# Patient Record
Sex: FEMALE | Race: WHITE | NOT HISPANIC OR LATINO | URBAN - METROPOLITAN AREA
[De-identification: names, ages, dates, MRNs, and addresses within clinical notes are randomized per-mention and may not be internally consistent; named-entity substitution may affect disease eponyms.]

---

## 2023-12-27 DIAGNOSIS — E78.5 HYPERLIPIDEMIA, UNSPECIFIED HYPERLIPIDEMIA TYPE: Primary | ICD-10-CM

## 2023-12-28 RX ORDER — ROSUVASTATIN CALCIUM 10 MG/1
10 TABLET, COATED ORAL DAILY
Qty: 90 TABLET | Refills: 3 | Status: SHIPPED | OUTPATIENT
Start: 2023-12-28

## 2024-01-30 DIAGNOSIS — I10 BENIGN ESSENTIAL HYPERTENSION: Primary | ICD-10-CM

## 2024-01-30 RX ORDER — LISINOPRIL AND HYDROCHLOROTHIAZIDE 10; 12.5 MG/1; MG/1
1 TABLET ORAL DAILY
Qty: 90 TABLET | Refills: 1 | Status: SHIPPED | OUTPATIENT
Start: 2024-01-30

## 2024-05-03 ENCOUNTER — APPOINTMENT (OUTPATIENT)
Dept: PRIMARY CARE | Facility: CLINIC | Age: 64
End: 2024-05-03
Payer: COMMERCIAL

## 2024-06-14 ENCOUNTER — OFFICE VISIT (OUTPATIENT)
Dept: PRIMARY CARE | Facility: CLINIC | Age: 64
End: 2024-06-14
Payer: COMMERCIAL

## 2024-06-14 VITALS
SYSTOLIC BLOOD PRESSURE: 138 MMHG | BODY MASS INDEX: 30.7 KG/M2 | HEIGHT: 66 IN | OXYGEN SATURATION: 99 % | HEART RATE: 79 BPM | WEIGHT: 191 LBS | DIASTOLIC BLOOD PRESSURE: 84 MMHG

## 2024-06-14 DIAGNOSIS — R73.01 IFG (IMPAIRED FASTING GLUCOSE): ICD-10-CM

## 2024-06-14 DIAGNOSIS — Z00.00 ROUTINE PHYSICAL EXAMINATION: Primary | ICD-10-CM

## 2024-06-14 DIAGNOSIS — E78.5 HYPERLIPIDEMIA, UNSPECIFIED HYPERLIPIDEMIA TYPE: ICD-10-CM

## 2024-06-14 DIAGNOSIS — I10 ESSENTIAL HYPERTENSION, BENIGN: ICD-10-CM

## 2024-06-14 DIAGNOSIS — R73.03 PRE-DIABETES: ICD-10-CM

## 2024-06-14 DIAGNOSIS — E78.2 MIXED HYPERLIPIDEMIA: ICD-10-CM

## 2024-06-14 PROCEDURE — 3079F DIAST BP 80-89 MM HG: CPT | Performed by: PHYSICIAN ASSISTANT

## 2024-06-14 PROCEDURE — 99396 PREV VISIT EST AGE 40-64: CPT | Performed by: PHYSICIAN ASSISTANT

## 2024-06-14 PROCEDURE — 3075F SYST BP GE 130 - 139MM HG: CPT | Performed by: PHYSICIAN ASSISTANT

## 2024-06-14 PROCEDURE — 1036F TOBACCO NON-USER: CPT | Performed by: PHYSICIAN ASSISTANT

## 2024-06-14 RX ORDER — LISINOPRIL AND HYDROCHLOROTHIAZIDE 12.5; 2 MG/1; MG/1
1 TABLET ORAL DAILY
Qty: 90 TABLET | Refills: 3 | Status: SHIPPED | OUTPATIENT
Start: 2024-06-14 | End: 2025-07-19

## 2024-06-14 RX ORDER — ROSUVASTATIN CALCIUM 10 MG/1
10 TABLET, COATED ORAL DAILY
Qty: 90 TABLET | Refills: 3 | Status: SHIPPED | OUTPATIENT
Start: 2024-06-14

## 2024-06-14 ASSESSMENT — PAIN SCALES - GENERAL: PAINLEVEL: 0-NO PAIN

## 2024-06-14 ASSESSMENT — PATIENT HEALTH QUESTIONNAIRE - PHQ9
2. FEELING DOWN, DEPRESSED OR HOPELESS: NOT AT ALL
1. LITTLE INTEREST OR PLEASURE IN DOING THINGS: NOT AT ALL
SUM OF ALL RESPONSES TO PHQ9 QUESTIONS 1 AND 2: 0

## 2024-06-14 NOTE — PROGRESS NOTES
"Subjective   Patient ID: Magda Jaimes is a 63 y.o. female who presents for Annual Exam.    Presents for RPE.   Doing well overall. Prior  labs show IFG and IR pattern.   She denies PC, SON, any other concerns.   Colon CA screening UTD.   Mammogram 1/24         Review of Systems   All other systems reviewed and are negative.      Objective   /84   Pulse 79   Ht 1.676 m (5' 6\")   Wt 86.6 kg (191 lb)   SpO2 99%   BMI 30.83 kg/m²     Physical Exam  Constitutional:       Appearance: Normal appearance.   HENT:      Right Ear: Tympanic membrane and ear canal normal.      Left Ear: Ear canal normal.      Nose: Nose normal.      Mouth/Throat:      Pharynx: Oropharynx is clear.   Eyes:      Pupils: Pupils are equal, round, and reactive to light.   Cardiovascular:      Rate and Rhythm: Normal rate and regular rhythm.   Pulmonary:      Breath sounds: Normal breath sounds.   Abdominal:      General: Bowel sounds are normal.      Palpations: Abdomen is soft.   Musculoskeletal:         General: Normal range of motion.   Lymphadenopathy:      Cervical: No cervical adenopathy.   Skin:     Findings: No rash.   Neurological:      General: No focal deficit present.      Mental Status: She is alert.         Assessment/Plan   Diagnoses and all orders for this visit:  Routine physical examination  -     CBC; Future  -     TSH with reflex to Free T4 if abnormal; Future  IFG (impaired fasting glucose)  -     Hemoglobin A1c; Future  Mixed hyperlipidemia  -     Comprehensive metabolic panel; Future  -     Lipid panel; Future  Hyperlipidemia, unspecified hyperlipidemia type  -     rosuvastatin (Crestor) 10 mg tablet; Take 1 tablet (10 mg) by mouth once daily.  Essential hypertension, benign  -     lisinopriL-hydrochlorothiazide 20-12.5 mg tablet; Take 1 tablet by mouth once daily.  Pre-diabetes    Low carb diet and exercise, recheck 6 months     "

## 2024-06-15 ENCOUNTER — LAB (OUTPATIENT)
Dept: LAB | Facility: LAB | Age: 64
End: 2024-06-15
Payer: COMMERCIAL

## 2024-06-15 DIAGNOSIS — Z00.00 ROUTINE PHYSICAL EXAMINATION: ICD-10-CM

## 2024-06-15 DIAGNOSIS — E78.2 MIXED HYPERLIPIDEMIA: ICD-10-CM

## 2024-06-15 DIAGNOSIS — R73.01 IFG (IMPAIRED FASTING GLUCOSE): ICD-10-CM

## 2024-06-15 LAB
ALBUMIN SERPL BCP-MCNC: 4.2 G/DL (ref 3.4–5)
ALP SERPL-CCNC: 70 U/L (ref 33–136)
ALT SERPL W P-5'-P-CCNC: 20 U/L (ref 7–45)
ANION GAP SERPL CALC-SCNC: 14 MMOL/L (ref 10–20)
AST SERPL W P-5'-P-CCNC: 27 U/L (ref 9–39)
BILIRUB SERPL-MCNC: 1.1 MG/DL (ref 0–1.2)
BUN SERPL-MCNC: 10 MG/DL (ref 6–23)
CALCIUM SERPL-MCNC: 9.4 MG/DL (ref 8.6–10.3)
CHLORIDE SERPL-SCNC: 96 MMOL/L (ref 98–107)
CHOLEST SERPL-MCNC: 192 MG/DL (ref 0–199)
CHOLESTEROL/HDL RATIO: 4.6
CO2 SERPL-SCNC: 28 MMOL/L (ref 21–32)
CREAT SERPL-MCNC: 0.61 MG/DL (ref 0.5–1.05)
EGFRCR SERPLBLD CKD-EPI 2021: >90 ML/MIN/1.73M*2
ERYTHROCYTE [DISTWIDTH] IN BLOOD BY AUTOMATED COUNT: 11.9 % (ref 11.5–14.5)
EST. AVERAGE GLUCOSE BLD GHB EST-MCNC: 126 MG/DL
GLUCOSE SERPL-MCNC: 98 MG/DL (ref 74–99)
HBA1C MFR BLD: 6 %
HCT VFR BLD AUTO: 40.9 % (ref 36–46)
HDLC SERPL-MCNC: 42 MG/DL
HGB BLD-MCNC: 14.2 G/DL (ref 12–16)
LDLC SERPL CALC-MCNC: 99 MG/DL
MCH RBC QN AUTO: 30.9 PG (ref 26–34)
MCHC RBC AUTO-ENTMCNC: 34.7 G/DL (ref 32–36)
MCV RBC AUTO: 89 FL (ref 80–100)
NON HDL CHOLESTEROL: 150 MG/DL (ref 0–149)
NRBC BLD-RTO: 0 /100 WBCS (ref 0–0)
PLATELET # BLD AUTO: 251 X10*3/UL (ref 150–450)
POTASSIUM SERPL-SCNC: 3.7 MMOL/L (ref 3.5–5.3)
PROT SERPL-MCNC: 7.1 G/DL (ref 6.4–8.2)
RBC # BLD AUTO: 4.59 X10*6/UL (ref 4–5.2)
SODIUM SERPL-SCNC: 134 MMOL/L (ref 136–145)
TRIGL SERPL-MCNC: 256 MG/DL (ref 0–149)
TSH SERPL-ACNC: 1.01 MIU/L (ref 0.44–3.98)
VLDL: 51 MG/DL (ref 0–40)
WBC # BLD AUTO: 5.2 X10*3/UL (ref 4.4–11.3)

## 2024-06-15 PROCEDURE — 36415 COLL VENOUS BLD VENIPUNCTURE: CPT

## 2024-06-15 PROCEDURE — 83036 HEMOGLOBIN GLYCOSYLATED A1C: CPT

## 2024-06-17 ENCOUNTER — TELEPHONE (OUTPATIENT)
Dept: PRIMARY CARE | Facility: CLINIC | Age: 64
End: 2024-06-17
Payer: COMMERCIAL

## 2024-06-17 NOTE — TELEPHONE ENCOUNTER
Labs show Pre-diabetes as discussed at office visit. Low carb diet and exercise. Follow up 6 months.

## 2024-07-02 DIAGNOSIS — E78.5 HYPERLIPIDEMIA, UNSPECIFIED HYPERLIPIDEMIA TYPE: ICD-10-CM

## 2024-07-02 RX ORDER — ROSUVASTATIN CALCIUM 10 MG/1
10 TABLET, COATED ORAL DAILY
Qty: 90 TABLET | Refills: 3 | Status: SHIPPED | OUTPATIENT
Start: 2024-07-02

## 2024-07-02 NOTE — TELEPHONE ENCOUNTER
SEE RX   LAST VISIT 06/14/24  Patient said she lost her current prescription, she thinks it fell out of her purse.

## 2024-10-18 ENCOUNTER — APPOINTMENT (OUTPATIENT)
Dept: CARDIOLOGY | Facility: HOSPITAL | Age: 64
End: 2024-10-18
Payer: COMMERCIAL

## 2024-10-18 ENCOUNTER — APPOINTMENT (OUTPATIENT)
Dept: RADIOLOGY | Facility: HOSPITAL | Age: 64
End: 2024-10-18
Payer: COMMERCIAL

## 2024-10-18 ENCOUNTER — HOSPITAL ENCOUNTER (EMERGENCY)
Facility: HOSPITAL | Age: 64
Discharge: HOME | End: 2024-10-18
Attending: EMERGENCY MEDICINE
Payer: COMMERCIAL

## 2024-10-18 VITALS
OXYGEN SATURATION: 97 % | BODY MASS INDEX: 30.53 KG/M2 | RESPIRATION RATE: 18 BRPM | TEMPERATURE: 98 F | HEART RATE: 70 BPM | SYSTOLIC BLOOD PRESSURE: 131 MMHG | HEIGHT: 66 IN | WEIGHT: 190 LBS | DIASTOLIC BLOOD PRESSURE: 76 MMHG

## 2024-10-18 DIAGNOSIS — R51.9 NONINTRACTABLE HEADACHE, UNSPECIFIED CHRONICITY PATTERN, UNSPECIFIED HEADACHE TYPE: Primary | ICD-10-CM

## 2024-10-18 DIAGNOSIS — R20.2 PARESTHESIA: ICD-10-CM

## 2024-10-18 DIAGNOSIS — R06.02 SHORTNESS OF BREATH: ICD-10-CM

## 2024-10-18 LAB
ALBUMIN SERPL BCP-MCNC: 4.6 G/DL (ref 3.4–5)
ALP SERPL-CCNC: 81 U/L (ref 33–136)
ALT SERPL W P-5'-P-CCNC: 19 U/L (ref 7–45)
ANION GAP BLDV CALCULATED.4IONS-SCNC: 12 MMOL/L (ref 10–25)
ANION GAP SERPL CALC-SCNC: 12 MMOL/L (ref 10–20)
AST SERPL W P-5'-P-CCNC: 23 U/L (ref 9–39)
BASE EXCESS BLDV CALC-SCNC: 3.2 MMOL/L (ref -2–3)
BASOPHILS # BLD AUTO: 0.03 X10*3/UL (ref 0–0.1)
BASOPHILS NFR BLD AUTO: 0.5 %
BILIRUB SERPL-MCNC: 0.8 MG/DL (ref 0–1.2)
BNP SERPL-MCNC: 58 PG/ML (ref 0–99)
BODY TEMPERATURE: ABNORMAL
BUN SERPL-MCNC: 10 MG/DL (ref 6–23)
CA-I BLDV-SCNC: 1.21 MMOL/L (ref 1.1–1.33)
CALCIUM SERPL-MCNC: 9.5 MG/DL (ref 8.6–10.3)
CARDIAC TROPONIN I PNL SERPL HS: 4 NG/L (ref 0–13)
CARDIAC TROPONIN I PNL SERPL HS: 4 NG/L (ref 0–13)
CHLORIDE BLDV-SCNC: 98 MMOL/L (ref 98–107)
CHLORIDE SERPL-SCNC: 100 MMOL/L (ref 98–107)
CO2 SERPL-SCNC: 28 MMOL/L (ref 21–32)
CREAT SERPL-MCNC: 0.83 MG/DL (ref 0.5–1.05)
D DIMER PPP FEU-MCNC: 532 NG/ML FEU
EGFRCR SERPLBLD CKD-EPI 2021: 79 ML/MIN/1.73M*2
EOSINOPHIL # BLD AUTO: 0.09 X10*3/UL (ref 0–0.7)
EOSINOPHIL NFR BLD AUTO: 1.4 %
ERYTHROCYTE [DISTWIDTH] IN BLOOD BY AUTOMATED COUNT: 12.3 % (ref 11.5–14.5)
FLUAV RNA RESP QL NAA+PROBE: NOT DETECTED
FLUBV RNA RESP QL NAA+PROBE: NOT DETECTED
GLUCOSE BLDV-MCNC: 115 MG/DL (ref 74–99)
GLUCOSE SERPL-MCNC: 128 MG/DL (ref 74–99)
HCO3 BLDV-SCNC: 27.9 MMOL/L (ref 22–26)
HCT VFR BLD AUTO: 41.8 % (ref 36–46)
HCT VFR BLD EST: 43 % (ref 36–46)
HGB BLD-MCNC: 14.5 G/DL (ref 12–16)
HGB BLDV-MCNC: 14.2 G/DL (ref 12–16)
IMM GRANULOCYTES # BLD AUTO: 0.01 X10*3/UL (ref 0–0.7)
IMM GRANULOCYTES NFR BLD AUTO: 0.2 % (ref 0–0.9)
INHALED O2 CONCENTRATION: 21 %
LACTATE BLDV-SCNC: 2.3 MMOL/L (ref 0.4–2)
LYMPHOCYTES # BLD AUTO: 2.15 X10*3/UL (ref 1.2–4.8)
LYMPHOCYTES NFR BLD AUTO: 33 %
MCH RBC QN AUTO: 31.6 PG (ref 26–34)
MCHC RBC AUTO-ENTMCNC: 34.7 G/DL (ref 32–36)
MCV RBC AUTO: 91 FL (ref 80–100)
MONOCYTES # BLD AUTO: 0.56 X10*3/UL (ref 0.1–1)
MONOCYTES NFR BLD AUTO: 8.6 %
NEUTROPHILS # BLD AUTO: 3.68 X10*3/UL (ref 1.2–7.7)
NEUTROPHILS NFR BLD AUTO: 56.3 %
NRBC BLD-RTO: 0 /100 WBCS (ref 0–0)
OXYHGB MFR BLDV: 77.1 % (ref 45–75)
PCO2 BLDV: 42 MM HG (ref 41–51)
PH BLDV: 7.43 PH (ref 7.33–7.43)
PLATELET # BLD AUTO: 256 X10*3/UL (ref 150–450)
PO2 BLDV: 46 MM HG (ref 35–45)
POTASSIUM BLDV-SCNC: 3.6 MMOL/L (ref 3.5–5.3)
POTASSIUM SERPL-SCNC: 3.7 MMOL/L (ref 3.5–5.3)
PROT SERPL-MCNC: 7.8 G/DL (ref 6.4–8.2)
RBC # BLD AUTO: 4.59 X10*6/UL (ref 4–5.2)
SAO2 % BLDV: 79 % (ref 45–75)
SARS-COV-2 RNA RESP QL NAA+PROBE: NOT DETECTED
SODIUM BLDV-SCNC: 134 MMOL/L (ref 136–145)
SODIUM SERPL-SCNC: 136 MMOL/L (ref 136–145)
WBC # BLD AUTO: 6.5 X10*3/UL (ref 4.4–11.3)

## 2024-10-18 PROCEDURE — 93005 ELECTROCARDIOGRAM TRACING: CPT

## 2024-10-18 PROCEDURE — 70450 CT HEAD/BRAIN W/O DYE: CPT | Performed by: STUDENT IN AN ORGANIZED HEALTH CARE EDUCATION/TRAINING PROGRAM

## 2024-10-18 PROCEDURE — 80053 COMPREHEN METABOLIC PANEL: CPT | Performed by: EMERGENCY MEDICINE

## 2024-10-18 PROCEDURE — 87635 SARS-COV-2 COVID-19 AMP PRB: CPT | Performed by: EMERGENCY MEDICINE

## 2024-10-18 PROCEDURE — 85379 FIBRIN DEGRADATION QUANT: CPT | Performed by: EMERGENCY MEDICINE

## 2024-10-18 PROCEDURE — 2550000001 HC RX 255 CONTRASTS: Performed by: EMERGENCY MEDICINE

## 2024-10-18 PROCEDURE — 85025 COMPLETE CBC W/AUTO DIFF WBC: CPT | Performed by: EMERGENCY MEDICINE

## 2024-10-18 PROCEDURE — 2500000004 HC RX 250 GENERAL PHARMACY W/ HCPCS (ALT 636 FOR OP/ED): Performed by: EMERGENCY MEDICINE

## 2024-10-18 PROCEDURE — 36415 COLL VENOUS BLD VENIPUNCTURE: CPT | Performed by: EMERGENCY MEDICINE

## 2024-10-18 PROCEDURE — 70450 CT HEAD/BRAIN W/O DYE: CPT

## 2024-10-18 PROCEDURE — 74177 CT ABD & PELVIS W/CONTRAST: CPT | Mod: FOREIGN READ | Performed by: RADIOLOGY

## 2024-10-18 PROCEDURE — 71045 X-RAY EXAM CHEST 1 VIEW: CPT | Mod: FOREIGN READ | Performed by: RADIOLOGY

## 2024-10-18 PROCEDURE — 99285 EMERGENCY DEPT VISIT HI MDM: CPT | Mod: 25

## 2024-10-18 PROCEDURE — 71045 X-RAY EXAM CHEST 1 VIEW: CPT

## 2024-10-18 PROCEDURE — 84484 ASSAY OF TROPONIN QUANT: CPT | Performed by: EMERGENCY MEDICINE

## 2024-10-18 PROCEDURE — 71275 CT ANGIOGRAPHY CHEST: CPT

## 2024-10-18 PROCEDURE — 71275 CT ANGIOGRAPHY CHEST: CPT | Mod: FOREIGN READ | Performed by: RADIOLOGY

## 2024-10-18 PROCEDURE — 74177 CT ABD & PELVIS W/CONTRAST: CPT

## 2024-10-18 PROCEDURE — 82435 ASSAY OF BLOOD CHLORIDE: CPT | Performed by: EMERGENCY MEDICINE

## 2024-10-18 PROCEDURE — 83880 ASSAY OF NATRIURETIC PEPTIDE: CPT | Performed by: EMERGENCY MEDICINE

## 2024-10-18 PROCEDURE — 96374 THER/PROPH/DIAG INJ IV PUSH: CPT | Mod: 59

## 2024-10-18 RX ORDER — KETOROLAC TROMETHAMINE 15 MG/ML
15 INJECTION, SOLUTION INTRAMUSCULAR; INTRAVENOUS ONCE
Status: COMPLETED | OUTPATIENT
Start: 2024-10-18 | End: 2024-10-18

## 2024-10-18 ASSESSMENT — PAIN SCALES - GENERAL
PAINLEVEL_OUTOF10: 7
PAINLEVEL_OUTOF10: 0 - NO PAIN

## 2024-10-18 ASSESSMENT — COLUMBIA-SUICIDE SEVERITY RATING SCALE - C-SSRS
2. HAVE YOU ACTUALLY HAD ANY THOUGHTS OF KILLING YOURSELF?: NO
1. IN THE PAST MONTH, HAVE YOU WISHED YOU WERE DEAD OR WISHED YOU COULD GO TO SLEEP AND NOT WAKE UP?: NO
6. HAVE YOU EVER DONE ANYTHING, STARTED TO DO ANYTHING, OR PREPARED TO DO ANYTHING TO END YOUR LIFE?: NO

## 2024-10-18 ASSESSMENT — PAIN - FUNCTIONAL ASSESSMENT: PAIN_FUNCTIONAL_ASSESSMENT: 0-10

## 2024-10-18 ASSESSMENT — PAIN DESCRIPTION - LOCATION: LOCATION: BACK

## 2024-10-18 ASSESSMENT — PAIN DESCRIPTION - ORIENTATION: ORIENTATION: LOWER

## 2024-10-18 NOTE — ED PROVIDER NOTES
HPI   Chief Complaint   Patient presents with    Shortness of Breath    Numbness    Eye Pain       HPI  Patient is a 64-year-old female presenting to the ED today for dizziness, shortness of breath, pressure behind her eyes, and bilateral knee numbness that all started approximately 1 hour prior to arrival.  Patient explains that shortly after eating lunch today, she started developing all of the symptoms.  She has been ambulatory without difficulty, but reports feeling dizzy/lightheaded.  With all of these symptoms, she decided to come to the ED for further evaluation.  At this time, she feels like there is a tight band around her lower chest/upper abdomen.  She reports some associated nausea.  She denies any vomiting or diarrhea.  She denies any recent fever or cough.      Patient History   Past Medical History:   Diagnosis Date    Allergy status to unspecified drugs, medicaments and biological substances     Multiple allergies    Pure hypercholesterolemia, unspecified     High cholesterol     Past Surgical History:   Procedure Laterality Date    HERNIA REPAIR  12/05/2016    Hernia Repair    HYSTERECTOMY  12/05/2016    Hysterectomy    OTHER SURGICAL HISTORY  12/05/2016    Breast Surgery Enlargement Procedure Bilateral    OTHER SURGICAL HISTORY  11/14/2018    Bladder surgery     No family history on file.  Social History     Tobacco Use    Smoking status: Former     Types: Cigarettes    Smokeless tobacco: Never   Substance Use Topics    Alcohol use: Never    Drug use: Never       Physical Exam   ED Triage Vitals [10/18/24 1619]   Temperature Heart Rate Respirations BP   36.7 °C (98 °F) 76 20 (!) 187/93      Pulse Ox Temp Source Heart Rate Source Patient Position   97 % Oral Monitor --      BP Location FiO2 (%)     -- --       Physical Exam  Vitals and nursing note reviewed.   Constitutional:       General: She is not in acute distress.     Appearance: She is not toxic-appearing.   HENT:      Head: Normocephalic.       Mouth/Throat:      Mouth: Mucous membranes are moist.   Eyes:      Extraocular Movements: Extraocular movements intact.      Conjunctiva/sclera: Conjunctivae normal.   Cardiovascular:      Rate and Rhythm: Normal rate and regular rhythm.      Pulses: Normal pulses.   Pulmonary:      Effort: Pulmonary effort is normal. No respiratory distress.      Breath sounds: Normal breath sounds. No wheezing.   Abdominal:      General: There is no distension.      Palpations: Abdomen is soft.      Comments: Mild epigastric tenderness to palpation   Musculoskeletal:         General: No swelling.      Cervical back: Neck supple.   Skin:     General: Skin is warm and dry.      Capillary Refill: Capillary refill takes less than 2 seconds.   Neurological:      General: No focal deficit present.      Mental Status: She is alert. Mental status is at baseline.      Comments: This patient is awake, alert and oriented to person, place and time. Speech is clear and fluent. Cranial nerves II-XII are grossly intact. Strength and sensation are intact in all extremities. NIHSS 0, stable gait, and negative test of skew.           ED Course & MDM   ED Course as of 10/18/24 1845   Fri Oct 18, 2024   1703 EKG obtained at 1617, interpreted by myself.  Normal sinus rhythm with a ventricular rate of 75, no axis deviation, normal intervals, with no acute ischemic changes [VT]   1749 EKG obtained at 1732, interpreted by myself.  Normal sinus rhythm with a ventricular rate of 69, no axis deviation, normal intervals, with no acute ischemic changes [VT]      ED Course User Index  [VT] Bettina GILLIS MD             No data recorded     John Coma Scale Score: 15 (10/18/24 1625 : Elena Castano RN)       NIH Stroke Scale: 1 (10/18/24 1625 : Elena Castano RN)                 Medical Decision Making  Patient was seen and evaluated for shortness of breath, dizziness, headache, knee numbness.  On arrival, patient is oxygenating well on room air.   Patient has no focal neurological deficits, with stable gait.  Additional labs and imaging are ordered for further evaluation of the patient's symptoms.    VBG is unremarkable.  CBC is unremarkable.  CMP is unremarkable.  BNP is normal at 58.  High-sensitivity troponin is normal at 4, with repeat still normal at 4.  Influenza and COVID-19 swabs are negative.    At this time, patient's imaging remains pending.  Patient will be signed out to the oncoming physician, Dr. Shipman, pending the results of her imaging, reevaluation, and further disposition.    Procedure  Procedures     Bettina GILLIS MD  10/18/24 3925

## 2024-10-18 NOTE — ED TRIAGE NOTES
Pt arrives ambulatory to [facility], presenting with shortness of breath, dizziness, eye pain, and bilateral knee numbness that started approximately 1 hour PTA to ED. Patient reports she has a PMH of HTN, takes Lipitor daily. Pt denies any CP, vomiting, diarrhea, fever and/or chills. Upon assessment, initial NIH 1 for mild-to-moderate sensory loss in her knees. Patient A&Ox4, resp easy and unlabored, skin of appropriate color. EKG obtained, pt placed on cardiac monitor. IV access established.

## 2024-10-19 NOTE — PROGRESS NOTES
Emergency Medicine Transition of Care Note.    I received Magda Jaimes in signout from Dr. CARLIN Roberts.  Please see the previous ED provider note for all HPI, PE and MDM up to the time of signout at 1900. This is in addition to the primary record.    In brief Magda Jaimes is an 64 y.o. female presenting for   Chief Complaint   Patient presents with    Shortness of Breath    Numbness    Eye Pain     At the time of signout we were awaiting: results of imaging    Patient was seen and evaluated for shortness of breath, dizziness, headache, knee numbness.  On arrival, patient is oxygenating well on room air.  Patient has no focal neurological deficits, with stable gait.  Additional labs and imaging are ordered for further evaluation of the patient's symptoms.     VBG is unremarkable.  CBC is unremarkable.  CMP is unremarkable.  BNP is normal at 58.  High-sensitivity troponin is normal at 4, with repeat still normal at 4.  Influenza and COVID-19 swabs are negative.     At this time, patient's imaging remains pending.  Patient will be signed out to the oncoming physician, Dr. Shipman, pending the results of her imaging, reevaluation, and further disposition.    Labs Reviewed   COMPREHENSIVE METABOLIC PANEL - Abnormal       Result Value    Glucose 128 (*)     Sodium 136      Potassium 3.7      Chloride 100      Bicarbonate 28      Anion Gap 12      Urea Nitrogen 10      Creatinine 0.83      eGFR 79      Calcium 9.5      Albumin 4.6      Alkaline Phosphatase 81      Total Protein 7.8      AST 23      Bilirubin, Total 0.8      ALT 19     D-DIMER, NON VTE - Abnormal    D-Dimer Non VTE, Quant (ng/mL FEU) 532 (*)     Narrative:     The D-Dimer assay is reported in ng/mL Fibrinogen Equivalent Units (FEU). The results of this assay should NOT be used for the exclusion of Deep Vein Thrombosis and/or Pulmonary Embolism.   BLOOD GAS VENOUS FULL PANEL - Abnormal    POCT pH, Venous 7.43      POCT pCO2, Venous 42      POCT pO2,  Venous 46 (*)     POCT SO2, Venous 79 (*)     POCT Oxy Hemoglobin, Venous 77.1 (*)     POCT Hematocrit Calculated, Venous 43.0      POCT Sodium, Venous 134 (*)     POCT Potassium, Venous 3.6      POCT Chloride, Venous 98      POCT Ionized Calicum, Venous 1.21      POCT Glucose, Venous 115 (*)     POCT Lactate, Venous 2.3 (*)     POCT Base Excess, Venous 3.2 (*)     POCT HCO3 Calculated, Venous 27.9 (*)     POCT Hemoglobin, Venous 14.2      POCT Anion Gap, Venous 12.0      Patient Temperature        FiO2 21     B-TYPE NATRIURETIC PEPTIDE - Normal    BNP 58      Narrative:        <100 pg/mL - Heart failure unlikely  100-299 pg/mL - Intermediate probability of acute heart                  failure exacerbation. Correlate with clinical                  context and patient history.    >=300 pg/mL - Heart Failure likely. Correlate with clinical                  context and patient history.    BNP testing is performed using different testing methodology at [facility] than at other [facility]. Direct result comparisons should only be made within the same method.      SERIAL TROPONIN-INITIAL - Normal    Troponin I, High Sensitivity 4      Narrative:     Less than 99th percentile of normal range cutoff-  Female and children under 18 years old <14 ng/L; Male <21 ng/L: Negative  Repeat testing should be performed if clinically indicated.     Female and children under 18 years old 14-50 ng/L; Male 21-50 ng/L:  Consistent with possible cardiac damage and possible increased clinical   risk. Serial measurements may help to assess extent of myocardial damage.     >50 ng/L: Consistent with cardiac damage, increased clinical risk and  myocardial infarction. Serial measurements may help assess extent of   myocardial damage.      NOTE: Children less than 1 year old may have higher baseline troponin   levels and results should be interpreted in conjunction with the overall   clinical context.     NOTE: Troponin I  testing is performed using a different   testing methodology at [facility] than at other   [facility]. Direct result comparisons should only   be made within the same method.   SERIAL TROPONIN, 1 HOUR - Normal    Troponin I, High Sensitivity 4      Narrative:     Less than 99th percentile of normal range cutoff-  Female and children under 18 years old <14 ng/L; Male <21 ng/L: Negative  Repeat testing should be performed if clinically indicated.     Female and children under 18 years old 14-50 ng/L; Male 21-50 ng/L:  Consistent with possible cardiac damage and possible increased clinical   risk. Serial measurements may help to assess extent of myocardial damage.     >50 ng/L: Consistent with cardiac damage, increased clinical risk and  myocardial infarction. Serial measurements may help assess extent of   myocardial damage.      NOTE: Children less than 1 year old may have higher baseline troponin   levels and results should be interpreted in conjunction with the overall   clinical context.     NOTE: Troponin I testing is performed using a different   testing methodology at [facility] than at other   [facility]. Direct result comparisons should only   be made within the same method.   SARS-COV-2 PCR - Normal    Coronavirus 2019, PCR Not Detected      Narrative:     This assay has received FDA Emergency Use Authorization (EUA) and is only authorized for the duration of time that circumstances exist to justify the authorization of the emergency use of in vitro diagnostic tests for the detection of SARS-CoV-2 virus and/or diagnosis of COVID-19 infection under section 564(b)(1) of the Act, 21 U.S.C. 360bbb-3(b)(1). This assay is an in vitro diagnostic nucleic acid amplification test for the qualitative detection of SARS-CoV-2 from nasopharyngeal specimens and has been validated for use at [facility]. Negative results do not preclude COVID-19 infections and  should not be used as the sole basis for diagnosis, treatment, or other management decisions.     INFLUENZA A AND B PCR - Normal    Flu A Result Not Detected      Flu B Result Not Detected      Narrative:     This assay is an in vitro diagnostic multiplex nucleic acid amplification test for the detection and discrimination of Influenza A & B from nasopharyngeal specimens, and has been validated for use at [facility]. Negative results do not preclude Influenza A/B infections, and should not be used as the sole basis for diagnosis, treatment, or other management decisions. If Influenza A/B and RSV PCR results are negative, testing for Parainfluenza virus, Adenovirus and Metapneumovirus is routinely performed for [facility] pediatric oncology and intensive care inpatients, and is available on other patients by placing an add-on request.   CBC WITH AUTO DIFFERENTIAL    WBC 6.5      nRBC 0.0      RBC 4.59      Hemoglobin 14.5      Hematocrit 41.8      MCV 91      MCH 31.6      MCHC 34.7      RDW 12.3      Platelets 256      Neutrophils % 56.3      Immature Granulocytes %, Automated 0.2      Lymphocytes % 33.0      Monocytes % 8.6      Eosinophils % 1.4      Basophils % 0.5      Neutrophils Absolute 3.68      Immature Granulocytes Absolute, Automated 0.01      Lymphocytes Absolute 2.15      Monocytes Absolute 0.56      Eosinophils Absolute 0.09      Basophils Absolute 0.03     TROPONIN SERIES- (INITIAL, 1 HR)    Narrative:     The following orders were created for panel order Troponin I Series, High Sensitivity (0, 1 HR).  Procedure                               Abnormality         Status                     ---------                               -----------         ------                     Troponin I, High Sensiti...[[id number]]  Normal              Final result               Troponin, High Sensitivi...[[id number]]  Normal              Final result                 Please view results for these tests on  the individual orders.        ED Course as of 10/18/24 2225   Fri Oct 18, 2024   1703 EKG obtained at 1617, interpreted by myself.  Normal sinus rhythm with a ventricular rate of 75, no axis deviation, normal intervals, with no acute ischemic changes [VT]   1749 EKG obtained at 1732, interpreted by myself.  Normal sinus rhythm with a ventricular rate of 69, no axis deviation, normal intervals, with no acute ischemic changes [VT]   2216 CXR:  IMPRESSION:  No acute cardiopulmonary findings.  Signed by Kike Lockett MD   [EC]   2217 CT Head:  IMPRESSION:  No acute cortical infarct or acute intracranial hemorrhage.  Suggestion of mild chronic microvascular ischemic change.   [EC]   2217 CT Chest/Abdomen/pelvis:  IMPRESSION:  1. No pulmonary emboli or acute pulmonary pathology.  2. No acute pathology in the abdomen or pelvis.  3. There are a few small lung nodules which are unchanged since the  chest CT dated 3/9/2020. These are likely benign.  Signed by Dean Pritchett MD   [EC]      ED Course User Index  [EC] Danial Shipman DO  [VT] Bettina GILLIS MD         Diagnoses as of 10/18/24 2225   Nonintractable headache, unspecified chronicity pattern, unspecified headache type   Shortness of breath   Paresthesia       Medical Decision Making  On reevaluation patient is resting comfortably.  Her headache is much improved.  Shortness of breath is improved.  Paresthesias are improved.  I reviewed her negative workup.  Lab work is grossly negative.  CT of the head is unremarkable.  CT of the chest abdomen pelvis is unremarkable.  Chest x-ray was unremarkable.  I reassured the patient that I did not think there was anything serious going on at this time.  I feel patient can be discharged home to follow-up with her primary care.  She been instructed to take Tylenol and or ibuprofen as needed for pain.  She is welcome to return if acutely worsening worrisome symptoms.        Final diagnoses:   [R51.9] Nonintractable  headache, unspecified chronicity pattern, unspecified headache type   [R06.02] Shortness of breath   [R20.2] Paresthesia           Procedure  Procedures    Danial Shipman DO

## 2024-10-19 NOTE — DISCHARGE INSTRUCTIONS
Blood work is unremarkable.  CT imaging of the head chest and abdomen is unremarkable.  You may take Tylenol and or ibuprofen as needed for pain.  Follow-up with your regular physician in 3 to 5 days for recheck, sooner if worse return.

## 2024-10-21 LAB
ATRIAL RATE: 69 BPM
ATRIAL RATE: 75 BPM
P AXIS: 69 DEGREES
P AXIS: 70 DEGREES
P OFFSET: 184 MS
P OFFSET: 189 MS
P ONSET: 126 MS
P ONSET: 129 MS
PR INTERVAL: 184 MS
PR INTERVAL: 188 MS
Q ONSET: 220 MS
Q ONSET: 221 MS
QRS COUNT: 11 BEATS
QRS COUNT: 12 BEATS
QRS DURATION: 82 MS
QRS DURATION: 92 MS
QT INTERVAL: 398 MS
QT INTERVAL: 420 MS
QTC CALCULATION(BAZETT): 444 MS
QTC CALCULATION(BAZETT): 450 MS
QTC FREDERICIA: 428 MS
QTC FREDERICIA: 440 MS
R AXIS: 1 DEGREES
R AXIS: 13 DEGREES
T AXIS: 39 DEGREES
T AXIS: 44 DEGREES
T OFFSET: 420 MS
T OFFSET: 430 MS
VENTRICULAR RATE: 69 BPM
VENTRICULAR RATE: 75 BPM

## 2024-11-01 ENCOUNTER — TELEPHONE (OUTPATIENT)
Dept: PRIMARY CARE | Facility: CLINIC | Age: 64
End: 2024-11-01
Payer: COMMERCIAL

## 2025-04-18 ENCOUNTER — APPOINTMENT (OUTPATIENT)
Dept: PRIMARY CARE | Facility: CLINIC | Age: 65
End: 2025-04-18
Payer: COMMERCIAL

## 2025-04-25 ENCOUNTER — OFFICE VISIT (OUTPATIENT)
Dept: PRIMARY CARE | Facility: CLINIC | Age: 65
End: 2025-04-25
Payer: COMMERCIAL

## 2025-04-25 VITALS
SYSTOLIC BLOOD PRESSURE: 136 MMHG | HEIGHT: 66 IN | WEIGHT: 195 LBS | BODY MASS INDEX: 31.34 KG/M2 | OXYGEN SATURATION: 96 % | HEART RATE: 76 BPM | DIASTOLIC BLOOD PRESSURE: 82 MMHG

## 2025-04-25 DIAGNOSIS — I10 ESSENTIAL HYPERTENSION, BENIGN: ICD-10-CM

## 2025-04-25 DIAGNOSIS — Z12.31 SCREENING MAMMOGRAM FOR BREAST CANCER: ICD-10-CM

## 2025-04-25 DIAGNOSIS — Z01.818 PRE-OPERATIVE CLEARANCE: Primary | ICD-10-CM

## 2025-04-25 DIAGNOSIS — R73.03 PRE-DIABETES: ICD-10-CM

## 2025-04-25 DIAGNOSIS — E78.2 MIXED HYPERLIPIDEMIA: ICD-10-CM

## 2025-04-25 DIAGNOSIS — H26.9 ACQUIRED CATARACT: ICD-10-CM

## 2025-04-25 DIAGNOSIS — Z13.9 SCREENING FOR CONDITION: ICD-10-CM

## 2025-04-25 PROCEDURE — 1036F TOBACCO NON-USER: CPT | Performed by: PHYSICIAN ASSISTANT

## 2025-04-25 PROCEDURE — 99214 OFFICE O/P EST MOD 30 MIN: CPT | Performed by: PHYSICIAN ASSISTANT

## 2025-04-25 PROCEDURE — 3008F BODY MASS INDEX DOCD: CPT | Performed by: PHYSICIAN ASSISTANT

## 2025-04-25 PROCEDURE — 3079F DIAST BP 80-89 MM HG: CPT | Performed by: PHYSICIAN ASSISTANT

## 2025-04-25 PROCEDURE — 3075F SYST BP GE 130 - 139MM HG: CPT | Performed by: PHYSICIAN ASSISTANT

## 2025-04-25 ASSESSMENT — PATIENT HEALTH QUESTIONNAIRE - PHQ9
1. LITTLE INTEREST OR PLEASURE IN DOING THINGS: NOT AT ALL
2. FEELING DOWN, DEPRESSED OR HOPELESS: NOT AT ALL
SUM OF ALL RESPONSES TO PHQ9 QUESTIONS 1 AND 2: 0

## 2025-04-25 ASSESSMENT — PAIN SCALES - GENERAL: PAINLEVEL_OUTOF10: 0-NO PAIN

## 2025-04-25 NOTE — PROGRESS NOTES
"Subjective   Patient ID: Magda Jaimes is a 64 y.o. female who presents for Follow-up.    Presents for surgical clearance for cataracts.   States that she has not had any reactions to anesthesia other than low tolerance.   Scheduled with Dr. Tovar 5/5/25 and 5/12/25.   Reports worsening vision recently and dx with cataracts.   PMH reviewed and fairly stable other than IFG/prediabetes, HTN, HLD.          Review of Systems   All other systems reviewed and are negative.      Objective   /82   Pulse 76   Ht 1.676 m (5' 6\")   Wt 88.5 kg (195 lb)   SpO2 96%   BMI 31.47 kg/m²     Physical Exam  Constitutional:       Appearance: Normal appearance.   HENT:      Right Ear: Tympanic membrane normal.      Left Ear: Tympanic membrane normal.      Mouth/Throat:      Pharynx: Oropharynx is clear.   Cardiovascular:      Rate and Rhythm: Normal rate and regular rhythm.      Heart sounds: Normal heart sounds.   Pulmonary:      Breath sounds: Normal breath sounds.   Musculoskeletal:      Right lower leg: No edema.      Left lower leg: No edema.   Neurological:      Mental Status: She is alert.         Assessment/Plan   Diagnoses and all orders for this visit:  Pre-operative clearance  Pre-diabetes  -     Hemoglobin A1c; Future  Mixed hyperlipidemia  -     Comprehensive Metabolic Panel; Future  -     CBC; Future  -     Lipid Panel; Future  -     Thyroid Stimulating Hormone; Future  -     Hemoglobin A1c; Future  Essential hypertension, benign  -     Comprehensive Metabolic Panel; Future  Screening mammogram for breast cancer  -     BI mammo bilateral screening tomosynthesis; Future  Screening for condition  -     Comprehensive Metabolic Panel; Future  -     CBC; Future  -     Lipid Panel; Future  -     Thyroid Stimulating Hormone; Future  -     Hemoglobin A1c; Future  Acquired cataract         "

## 2025-04-26 LAB
ALBUMIN SERPL-MCNC: 4.7 G/DL (ref 3.6–5.1)
ALP SERPL-CCNC: 68 U/L (ref 37–153)
ALT SERPL-CCNC: 18 U/L (ref 6–29)
ANION GAP SERPL CALCULATED.4IONS-SCNC: 9 MMOL/L (CALC) (ref 7–17)
AST SERPL-CCNC: 21 U/L (ref 10–35)
BILIRUB SERPL-MCNC: 0.9 MG/DL (ref 0.2–1.2)
BUN SERPL-MCNC: 15 MG/DL (ref 7–25)
CALCIUM SERPL-MCNC: 9.5 MG/DL (ref 8.6–10.4)
CHLORIDE SERPL-SCNC: 96 MMOL/L (ref 98–110)
CHOLEST SERPL-MCNC: 218 MG/DL
CHOLEST/HDLC SERPL: 4.3 (CALC)
CO2 SERPL-SCNC: 28 MMOL/L (ref 20–32)
CREAT SERPL-MCNC: 0.55 MG/DL (ref 0.5–1.05)
EGFRCR SERPLBLD CKD-EPI 2021: 102 ML/MIN/1.73M2
ERYTHROCYTE [DISTWIDTH] IN BLOOD BY AUTOMATED COUNT: 12.7 % (ref 11–15)
EST. AVERAGE GLUCOSE BLD GHB EST-MCNC: 131 MG/DL
EST. AVERAGE GLUCOSE BLD GHB EST-SCNC: 7.3 MMOL/L
GLUCOSE SERPL-MCNC: 92 MG/DL (ref 65–99)
HBA1C MFR BLD: 6.2 %
HCT VFR BLD AUTO: 41.2 % (ref 35–45)
HDLC SERPL-MCNC: 51 MG/DL
HGB BLD-MCNC: 14.2 G/DL (ref 11.7–15.5)
LDLC SERPL CALC-MCNC: 130 MG/DL (CALC)
MCH RBC QN AUTO: 31.6 PG (ref 27–33)
MCHC RBC AUTO-ENTMCNC: 34.5 G/DL (ref 32–36)
MCV RBC AUTO: 91.6 FL (ref 80–100)
NONHDLC SERPL-MCNC: 167 MG/DL (CALC)
PLATELET # BLD AUTO: 264 THOUSAND/UL (ref 140–400)
PMV BLD REES-ECKER: 9.5 FL (ref 7.5–12.5)
POTASSIUM SERPL-SCNC: 3.9 MMOL/L (ref 3.5–5.3)
PROT SERPL-MCNC: 7.5 G/DL (ref 6.1–8.1)
RBC # BLD AUTO: 4.5 MILLION/UL (ref 3.8–5.1)
SODIUM SERPL-SCNC: 133 MMOL/L (ref 135–146)
TRIGL SERPL-MCNC: 230 MG/DL
TSH SERPL-ACNC: 0.97 MIU/L (ref 0.4–4.5)
WBC # BLD AUTO: 6.1 THOUSAND/UL (ref 3.8–10.8)

## 2025-05-02 ENCOUNTER — HOSPITAL ENCOUNTER (EMERGENCY)
Facility: HOSPITAL | Age: 65
Discharge: HOME | End: 2025-05-02
Payer: COMMERCIAL

## 2025-05-02 ENCOUNTER — APPOINTMENT (OUTPATIENT)
Dept: RADIOLOGY | Facility: HOSPITAL | Age: 65
End: 2025-05-02
Payer: COMMERCIAL

## 2025-05-02 ENCOUNTER — APPOINTMENT (OUTPATIENT)
Dept: CARDIOLOGY | Facility: HOSPITAL | Age: 65
End: 2025-05-02
Payer: COMMERCIAL

## 2025-05-02 VITALS
BODY MASS INDEX: 31.34 KG/M2 | OXYGEN SATURATION: 99 % | HEART RATE: 60 BPM | TEMPERATURE: 98.2 F | WEIGHT: 195 LBS | SYSTOLIC BLOOD PRESSURE: 144 MMHG | RESPIRATION RATE: 19 BRPM | HEIGHT: 66 IN | DIASTOLIC BLOOD PRESSURE: 88 MMHG

## 2025-05-02 DIAGNOSIS — R51.9 ACUTE NONINTRACTABLE HEADACHE, UNSPECIFIED HEADACHE TYPE: ICD-10-CM

## 2025-05-02 DIAGNOSIS — I10 ACCELERATED HYPERTENSION: ICD-10-CM

## 2025-05-02 DIAGNOSIS — R42 LIGHTHEADED: ICD-10-CM

## 2025-05-02 DIAGNOSIS — E87.1 HYPONATREMIA: Primary | ICD-10-CM

## 2025-05-02 LAB
ALBUMIN SERPL BCP-MCNC: 4.5 G/DL (ref 3.4–5)
ALP SERPL-CCNC: 71 U/L (ref 33–136)
ALT SERPL W P-5'-P-CCNC: 19 U/L (ref 7–45)
AMPHETAMINES UR QL SCN: NORMAL
ANION GAP SERPL CALC-SCNC: 11 MMOL/L (ref 10–20)
APAP SERPL-MCNC: <10 UG/ML (ref ?–30)
APPEARANCE UR: CLEAR
AST SERPL W P-5'-P-CCNC: 22 U/L (ref 9–39)
BARBITURATES UR QL SCN: NORMAL
BASOPHILS # BLD AUTO: 0.03 X10*3/UL (ref 0–0.1)
BASOPHILS NFR BLD AUTO: 0.4 %
BENZODIAZ UR QL SCN: NORMAL
BILIRUB SERPL-MCNC: 1 MG/DL (ref 0–1.2)
BILIRUB UR STRIP.AUTO-MCNC: NEGATIVE MG/DL
BNP SERPL-MCNC: 46 PG/ML (ref 0–99)
BUN SERPL-MCNC: 9 MG/DL (ref 6–23)
BZE UR QL SCN: NORMAL
CALCIUM SERPL-MCNC: 9.1 MG/DL (ref 8.6–10.3)
CANNABINOIDS UR QL SCN: NORMAL
CARDIAC TROPONIN I PNL SERPL HS: 3 NG/L (ref 0–13)
CHLORIDE SERPL-SCNC: 91 MMOL/L (ref 98–107)
CO2 SERPL-SCNC: 28 MMOL/L (ref 21–32)
COLOR UR: COLORLESS
CREAT SERPL-MCNC: 0.54 MG/DL (ref 0.5–1.05)
EGFRCR SERPLBLD CKD-EPI 2021: >90 ML/MIN/1.73M*2
EOSINOPHIL # BLD AUTO: 0.09 X10*3/UL (ref 0–0.7)
EOSINOPHIL NFR BLD AUTO: 1.3 %
ERYTHROCYTE [DISTWIDTH] IN BLOOD BY AUTOMATED COUNT: 11.9 % (ref 11.5–14.5)
ETHANOL SERPL-MCNC: <10 MG/DL
FENTANYL+NORFENTANYL UR QL SCN: NORMAL
FLUAV RNA RESP QL NAA+PROBE: NOT DETECTED
FLUBV RNA RESP QL NAA+PROBE: NOT DETECTED
GLUCOSE SERPL-MCNC: 88 MG/DL (ref 74–99)
GLUCOSE UR STRIP.AUTO-MCNC: NORMAL MG/DL
HCT VFR BLD AUTO: 38.8 % (ref 36–46)
HGB BLD-MCNC: 13.9 G/DL (ref 12–16)
IMM GRANULOCYTES # BLD AUTO: 0.02 X10*3/UL (ref 0–0.7)
IMM GRANULOCYTES NFR BLD AUTO: 0.3 % (ref 0–0.9)
KETONES UR STRIP.AUTO-MCNC: NEGATIVE MG/DL
LEUKOCYTE ESTERASE UR QL STRIP.AUTO: NEGATIVE
LYMPHOCYTES # BLD AUTO: 2.1 X10*3/UL (ref 1.2–4.8)
LYMPHOCYTES NFR BLD AUTO: 30.7 %
MAGNESIUM SERPL-MCNC: 1.74 MG/DL (ref 1.6–2.4)
MCH RBC QN AUTO: 32 PG (ref 26–34)
MCHC RBC AUTO-ENTMCNC: 35.8 G/DL (ref 32–36)
MCV RBC AUTO: 89 FL (ref 80–100)
METHADONE UR QL SCN: NORMAL
MONOCYTES # BLD AUTO: 0.74 X10*3/UL (ref 0.1–1)
MONOCYTES NFR BLD AUTO: 10.8 %
NEUTROPHILS # BLD AUTO: 3.87 X10*3/UL (ref 1.2–7.7)
NEUTROPHILS NFR BLD AUTO: 56.5 %
NITRITE UR QL STRIP.AUTO: NEGATIVE
NRBC BLD-RTO: 0 /100 WBCS (ref 0–0)
OPIATES UR QL SCN: NORMAL
OXYCODONE+OXYMORPHONE UR QL SCN: NORMAL
PCP UR QL SCN: NORMAL
PH UR STRIP.AUTO: 6 [PH]
PLATELET # BLD AUTO: 258 X10*3/UL (ref 150–450)
POTASSIUM SERPL-SCNC: 3.3 MMOL/L (ref 3.5–5.3)
PROT SERPL-MCNC: 7.4 G/DL (ref 6.4–8.2)
PROT UR STRIP.AUTO-MCNC: NEGATIVE MG/DL
RBC # BLD AUTO: 4.34 X10*6/UL (ref 4–5.2)
RBC # UR STRIP.AUTO: NEGATIVE MG/DL
RSV RNA RESP QL NAA+PROBE: NOT DETECTED
SALICYLATES SERPL-MCNC: <3 MG/DL (ref ?–20)
SARS-COV-2 RNA RESP QL NAA+PROBE: NOT DETECTED
SODIUM SERPL-SCNC: 127 MMOL/L (ref 136–145)
SP GR UR STRIP.AUTO: 1
TSH SERPL-ACNC: 1.11 MIU/L (ref 0.44–3.98)
UROBILINOGEN UR STRIP.AUTO-MCNC: NORMAL MG/DL
WBC # BLD AUTO: 6.9 X10*3/UL (ref 4.4–11.3)

## 2025-05-02 PROCEDURE — 87637 SARSCOV2&INF A&B&RSV AMP PRB: CPT

## 2025-05-02 PROCEDURE — 70450 CT HEAD/BRAIN W/O DYE: CPT

## 2025-05-02 PROCEDURE — 84443 ASSAY THYROID STIM HORMONE: CPT

## 2025-05-02 PROCEDURE — 80320 DRUG SCREEN QUANTALCOHOLS: CPT

## 2025-05-02 PROCEDURE — 80307 DRUG TEST PRSMV CHEM ANLYZR: CPT

## 2025-05-02 PROCEDURE — 85025 COMPLETE CBC W/AUTO DIFF WBC: CPT

## 2025-05-02 PROCEDURE — 2500000002 HC RX 250 W HCPCS SELF ADMINISTERED DRUGS (ALT 637 FOR MEDICARE OP, ALT 636 FOR OP/ED)

## 2025-05-02 PROCEDURE — 84484 ASSAY OF TROPONIN QUANT: CPT

## 2025-05-02 PROCEDURE — 96361 HYDRATE IV INFUSION ADD-ON: CPT

## 2025-05-02 PROCEDURE — 84075 ASSAY ALKALINE PHOSPHATASE: CPT

## 2025-05-02 PROCEDURE — 83880 ASSAY OF NATRIURETIC PEPTIDE: CPT

## 2025-05-02 PROCEDURE — 99285 EMERGENCY DEPT VISIT HI MDM: CPT | Mod: 25

## 2025-05-02 PROCEDURE — 71045 X-RAY EXAM CHEST 1 VIEW: CPT

## 2025-05-02 PROCEDURE — 70450 CT HEAD/BRAIN W/O DYE: CPT | Performed by: STUDENT IN AN ORGANIZED HEALTH CARE EDUCATION/TRAINING PROGRAM

## 2025-05-02 PROCEDURE — 71045 X-RAY EXAM CHEST 1 VIEW: CPT | Performed by: RADIOLOGY

## 2025-05-02 PROCEDURE — 93005 ELECTROCARDIOGRAM TRACING: CPT

## 2025-05-02 PROCEDURE — 81003 URINALYSIS AUTO W/O SCOPE: CPT

## 2025-05-02 PROCEDURE — 80143 DRUG ASSAY ACETAMINOPHEN: CPT

## 2025-05-02 PROCEDURE — 2500000004 HC RX 250 GENERAL PHARMACY W/ HCPCS (ALT 636 FOR OP/ED): Mod: JZ

## 2025-05-02 PROCEDURE — 83735 ASSAY OF MAGNESIUM: CPT

## 2025-05-02 PROCEDURE — 96375 TX/PRO/DX INJ NEW DRUG ADDON: CPT

## 2025-05-02 PROCEDURE — 36415 COLL VENOUS BLD VENIPUNCTURE: CPT

## 2025-05-02 PROCEDURE — 96374 THER/PROPH/DIAG INJ IV PUSH: CPT

## 2025-05-02 RX ORDER — DIPHENHYDRAMINE HYDROCHLORIDE 50 MG/ML
25 INJECTION, SOLUTION INTRAMUSCULAR; INTRAVENOUS ONCE
Status: COMPLETED | OUTPATIENT
Start: 2025-05-02 | End: 2025-05-02

## 2025-05-02 RX ORDER — POTASSIUM CHLORIDE 20 MEQ/1
40 TABLET, EXTENDED RELEASE ORAL DAILY
Status: DISCONTINUED | OUTPATIENT
Start: 2025-05-02 | End: 2025-05-02 | Stop reason: HOSPADM

## 2025-05-02 RX ORDER — METOCLOPRAMIDE HYDROCHLORIDE 5 MG/ML
10 INJECTION INTRAMUSCULAR; INTRAVENOUS ONCE
Status: COMPLETED | OUTPATIENT
Start: 2025-05-02 | End: 2025-05-02

## 2025-05-02 RX ORDER — KETOROLAC TROMETHAMINE 15 MG/ML
15 INJECTION, SOLUTION INTRAMUSCULAR; INTRAVENOUS ONCE
Status: COMPLETED | OUTPATIENT
Start: 2025-05-02 | End: 2025-05-02

## 2025-05-02 RX ADMIN — POTASSIUM CHLORIDE 40 MEQ: 1500 TABLET, EXTENDED RELEASE ORAL at 16:46

## 2025-05-02 RX ADMIN — SODIUM CHLORIDE 500 ML: 0.9 INJECTION, SOLUTION INTRAVENOUS at 15:35

## 2025-05-02 RX ADMIN — KETOROLAC TROMETHAMINE 15 MG: 15 INJECTION, SOLUTION INTRAMUSCULAR; INTRAVENOUS at 15:36

## 2025-05-02 RX ADMIN — METOCLOPRAMIDE 10 MG: 5 INJECTION, SOLUTION INTRAMUSCULAR; INTRAVENOUS at 15:35

## 2025-05-02 RX ADMIN — DIPHENHYDRAMINE HYDROCHLORIDE 25 MG: 50 INJECTION, SOLUTION INTRAMUSCULAR; INTRAVENOUS at 15:35

## 2025-05-02 ASSESSMENT — COLUMBIA-SUICIDE SEVERITY RATING SCALE - C-SSRS
1. IN THE PAST MONTH, HAVE YOU WISHED YOU WERE DEAD OR WISHED YOU COULD GO TO SLEEP AND NOT WAKE UP?: NO
6. HAVE YOU EVER DONE ANYTHING, STARTED TO DO ANYTHING, OR PREPARED TO DO ANYTHING TO END YOUR LIFE?: NO
2. HAVE YOU ACTUALLY HAD ANY THOUGHTS OF KILLING YOURSELF?: NO

## 2025-05-02 ASSESSMENT — PAIN SCALES - GENERAL
PAINLEVEL_OUTOF10: 0 - NO PAIN
PAINLEVEL_OUTOF10: 0 - NO PAIN

## 2025-05-02 ASSESSMENT — PAIN - FUNCTIONAL ASSESSMENT: PAIN_FUNCTIONAL_ASSESSMENT: 0-10

## 2025-05-02 NOTE — ED PROVIDER NOTES
"HPI   Chief Complaint   Patient presents with    Multiple Medical Complaints.        Patient is a 64-year-old female with significant history of hypertension, hyperlipidemia presents to the ED for headache, lightheadedness and tingling in her legs times today.  Patient states she has a posterior headache does not have history of migraines or headaches.  Patient states this headache feels like a \"high blood pressure headache\" which she has experienced before.  Patient denies any injury to the head.  Patient is not on any blood thinners.  Patient denies any vision changes photosensitivity or sound sensitivity.  Patient states she does feel like she is going to pass out.  Patient's unsure if this is worse with standing or movement.  Patient denies any LOC.  Patient states typically her blood pressure is 130s.  Patient states she is compliant with her lisinopril 10 mg which she takes daily.  Patient states she felt like her blood pressure was high took her blood pressure at [facility] and it was 147/90.  Patient denies any tingling elsewhere other than her bilateral legs.  Patient states tingling extends from her knees into her toes.  Patient states all toes are tingling.  Patient states it is constant.  Patient states she had her mammogram this morning and then had a salad and water has not eaten anything else today.  Patient's  states she is working 14-hour days and believes she needs a break.  States  she is more fatigued and has not been sleeping much.  Patient denies any fever chills congestion cough chest pain or shortness of breath.  Patient states she may have some chest tightness.  Patient denies any history of blood clots recent travel or surgery.  Patient denies any history of MIs.  Patient denies any nausea or vomiting states she is belching and having increased flatulence.  Denies any abdominal pain diarrhea constipation dysuria.  Denies any tobacco alcohol or street drug abuse.              Patient " History   Medical History[1]  Surgical History[2]  Family History[3]  Social History[4]    Physical Exam   ED Triage Vitals [05/02/25 1510]   Temperature Heart Rate Respirations BP   36.8 °C (98.2 °F) 71 18 (!) 166/91      Pulse Ox Temp Source Heart Rate Source Patient Position   97 % Temporal Monitor Sitting      BP Location FiO2 (%)     Left arm --       Physical Exam  HENT:      Head: Normocephalic.   Cardiovascular:      Rate and Rhythm: Normal rate and regular rhythm.      Pulses: Normal pulses.   Pulmonary:      Effort: Pulmonary effort is normal.      Breath sounds: No wheezing, rhonchi or rales.   Abdominal:      Palpations: Abdomen is soft.      Tenderness: There is no abdominal tenderness. There is no guarding or rebound.   Musculoskeletal:         General: Normal range of motion.      Cervical back: Normal range of motion.      Right lower leg: No edema.      Left lower leg: No edema.   Neurological:      General: No focal deficit present.      Mental Status: She is alert and oriented to person, place, and time. Mental status is at baseline.      Cranial Nerves: No cranial nerve deficit.      Sensory: No sensory deficit.      Motor: No weakness.      Coordination: Coordination normal.           ED Course & MDM   ED Course as of 05/02/25 1719   Fri May 02, 2025   1550 EKG performed at 337 normal sinus rhythm left axis deviation no acute signs of ischemia ventricular rate 66 bpm []      ED Course User Index  [] Mee Pace PA-C         Diagnoses as of 05/02/25 1719   Hyponatremia   Acute nonintractable headache, unspecified headache type   Lightheaded   Accelerated hypertension                 No data recorded     John Coma Scale Score: 15 (05/02/25 1510 : Juan Jose Gavin RN)                           Medical Decision Making  Medical Decision Making:  Patient presented as described in HPI. Patient case including ROS, PE, and treatment and plan discussed with ED attending if attached as  paty. Due to patients presentation orders completed include as documented.  Patient presents to the ED for posterior headache, tingling in the legs and near syncope times today.  Patient is nontoxic-appearing no neurofocal deficits abdomen is soft and nontender lung sounds are clear no peripheral edema.  Patient given headache cocktail pending labs and imaging.  Imaging is negative.  Patient does have hyponatremia 127 potassium is 3.3 given potassium replacement.  Patient given fluids.  Rest of labs are unremarkable.  Patient educated on these findings.  Patient endorses improvement in symptoms.  Patient educated close follow-up with primary doctor to monitor sodium levels.  Patient also educated on keeping a blood pressure log morning afternoon evening and follow-up closely again with primary doctor.  Patient educated on any worsening symptoms to return.  Patient remained stable and discharged.  Patient was advised to follow up with PCP or recommended provider in 2-3 days for another evaluation and exam. I advised patient/guardian to return or go to closest emergency room immediately if symptoms change, get worse, new symptoms develop prior to follow up. If there is no improvement in symptoms in the next 24 hours they are advised to return for further evaluation and exam. I also explained the plan and treatment course. Patient/guardian is in agreement with plan, treatment course, and follow up and states verbally that they will comply.        Patient care discussed with: N/A  Social Determinants affecting care: N/A    Final diagnosis and disposition as below.  See CI    Homegoing. I discussed the differential; results and discharge plan with the patient and/or family/friend/caregiver if present.  I emphasized the importance of follow-up with the physician I referred them to in the timeframe recommended.  I explained reasons for the patient to return to the Emergency Department. They agreed that if they feel  their condition is worsening or if they have any other concern they should call 911 immediately for further assistance. I gave the patient an opportunity to ask all questions they had and answered all of them accordingly. They understand return precautions and discharge instructions. The patient and/or family/friend/caregiver expressed understanding verbally and that they would comply.       Disposition:  Discharge        This note has been transcribed using voice recognition and may contain grammatical errors, misplaced words, incorrect words, incorrect phrases or other errors.        CT head wo IV contrast   Final Result   No acute intracranial hemorrhage or significant mass effect. MRI may   be considered for further evaluation if warranted.        MACRO   None        Signed by: Ishan Grajeda 5/2/2025 4:21 PM   Dictation workstation:   [id number]      XR chest 1 view   Final Result   No evidence of acute intrathoracic abnormality.        Signed by: Jaylen Hernández 5/2/2025 3:38 PM   Dictation workstation:   [id number]         Labs Reviewed   COMPREHENSIVE METABOLIC PANEL - Abnormal       Result Value    Glucose 88      Sodium 127 (*)     Potassium 3.3 (*)     Chloride 91 (*)     Bicarbonate 28      Anion Gap 11      Urea Nitrogen 9      Creatinine 0.54      eGFR >90      Calcium 9.1      Albumin 4.5      Alkaline Phosphatase 71      Total Protein 7.4      AST 22      Bilirubin, Total 1.0      ALT 19     DRUG SCREEN,URINE - Normal    Amphetamine Screen, Urine Presumptive Negative      Barbiturate Screen, Urine Presumptive Negative      Benzodiazepines Screen, Urine Presumptive Negative      Cannabinoid Screen, Urine Presumptive Negative      Cocaine Metabolite Screen, Urine Presumptive Negative      Fentanyl Screen, Urine Presumptive Negative      Opiate Screen, Urine Presumptive Negative      Oxycodone Screen, Urine Presumptive Negative      PCP Screen, Urine Presumptive Negative      Methadone Screen, Urine  Presumptive Negative      Narrative:     Drug screen results are presumptive and should not be used to assess   compliance with prescribed medication. Contact the performing [facility] laboratory   to add-on definitive confirmatory testing if clinically indicated.    Toxicology screening results are reported qualitatively. The concentration must   be greater than or equal to the cutoff to be reported as positive. The concentration   at which the screening test can detect an individual drug or metabolite varies.   The absence of expected drug(s) and/or drug metabolite(s) may indicate non-compliance,   inappropriate timing of specimen collection relative to drug administration, poor drug   absorption, diluted/adulterated urine, or limitations of testing. For medical purposes   only; not valid for forensic use.    Interpretive questions should be directed to the laboratory medical directors.   ACUTE TOXICOLOGY PANEL, BLOOD - Normal    Acetaminophen <10.0      Salicylate  <3      Alcohol <10     MAGNESIUM - Normal    Magnesium 1.74     B-TYPE NATRIURETIC PEPTIDE - Normal    BNP 46      Narrative:        <100 pg/mL - Heart failure unlikely  100-299 pg/mL - Intermediate probability of acute heart                  failure exacerbation. Correlate with clinical                  context and patient history.    >=300 pg/mL - Heart Failure likely. Correlate with clinical                  context and patient history.    BNP testing is performed using different testing methodology at [facility] than at other [facility]. Direct result comparisons should only be made within the same method.      SARS-COV-2, INFLUENZA A/B AND RSV PCR - Normal    Coronavirus 2019, PCR Not Detected      Flu A Result Not Detected      Flu B Result Not Detected      RSV PCR Not Detected      Narrative:     This assay is an FDA-cleared, in vitro diagnostic nucleic acid amplification test for the qualitative detection and differentiation of  SARS CoV-2/ Influenza A/B/ RSV from nasopharyngeal specimens collected from individuals with signs and symptoms of respiratory tract infections, and has been validated for use at [facility]. Negative results do not preclude COVID-19/ Influenza A/B/ RSV infections and should not be used as the sole basis for diagnosis, treatment, or other management decisions. Testing for SARS CoV-2 is recommended only for patients who meet current clinical and/or epidemiological criteria defined by federal, state, or local public health directives.   SERIAL TROPONIN-INITIAL - Normal    Troponin I, High Sensitivity 3      Narrative:     Less than 99th percentile of normal range cutoff-  Female and children under 18 years old <14 ng/L; Male <21 ng/L: Negative  Repeat testing should be performed if clinically indicated.     Female and children under 18 years old 14-50 ng/L; Male 21-50 ng/L:  Consistent with possible cardiac damage and possible increased clinical   risk. Serial measurements may help to assess extent of myocardial damage.     >50 ng/L: Consistent with cardiac damage, increased clinical risk and  myocardial infarction. Serial measurements may help assess extent of   myocardial damage.      NOTE: Children less than 1 year old may have higher baseline troponin   levels and results should be interpreted in conjunction with the overall   clinical context.     NOTE: Troponin I testing is performed using a different   testing methodology at [facility] than at other   [facility]. Direct result comparisons should only   be made within the same method.   CBC WITH AUTO DIFFERENTIAL    WBC 6.9      nRBC 0.0      RBC 4.34      Hemoglobin 13.9      Hematocrit 38.8      MCV 89      MCH 32.0      MCHC 35.8      RDW 11.9      Platelets 258      Neutrophils % 56.5      Immature Granulocytes %, Automated 0.3      Lymphocytes % 30.7      Monocytes % 10.8      Eosinophils % 1.3      Basophils % 0.4       Neutrophils Absolute 3.87      Immature Granulocytes Absolute, Automated 0.02      Lymphocytes Absolute 2.10      Monocytes Absolute 0.74      Eosinophils Absolute 0.09      Basophils Absolute 0.03     URINALYSIS WITH REFLEX CULTURE AND MICROSCOPIC    Narrative:     The following orders were created for panel order Urinalysis with Reflex Culture and Microscopic.  Procedure                               Abnormality         Status                     ---------                               -----------         ------                     Urinalysis with Reflex C...[[id number]]                                                 Extra Urine Gray Tube[[id number]]                                                         Please view results for these tests on the individual orders.   TROPONIN SERIES- (INITIAL, 1 HR)    Narrative:     The following orders were created for panel order Troponin I Series, High Sensitivity (0, 1 HR).  Procedure                               Abnormality         Status                     ---------                               -----------         ------                     Troponin I, High Sensiti...[[id number]]  Normal              Final result               Troponin, High Sensitivi...[[id number]]                                                   Please view results for these tests on the individual orders.   URINALYSIS WITH REFLEX CULTURE AND MICROSCOPIC   EXTRA URINE GRAY TUBE   SERIAL TROPONIN, 1 HOUR   TSH WITH REFLEX TO FREE T4 IF ABNORMAL        Procedure  Procedures       [1]   Past Medical History:  Diagnosis Date    Allergy status to unspecified drugs, medicaments and biological substances     Multiple allergies    Pure hypercholesterolemia, unspecified     High cholesterol   [2]   Past Surgical History:  Procedure Laterality Date    HERNIA REPAIR  12/05/2016    Hernia Repair    HYSTERECTOMY  12/05/2016    Hysterectomy    OTHER SURGICAL HISTORY  12/05/2016    Breast Surgery Enlargement  Procedure Bilateral    OTHER SURGICAL HISTORY  11/14/2018    Bladder surgery   [3] No family history on file.  [4]   Social History  Tobacco Use    Smoking status: Former     Types: Cigarettes    Smokeless tobacco: Never   Vaping Use    Vaping status: Never Used   Substance Use Topics    Alcohol use: Never    Drug use: Never        Mee Pace PA-C  05/02/25 7874

## 2025-05-02 NOTE — ED TRIAGE NOTES
Pt BIB POV via WC to ED, c/o headache and feeling her bp elevated - took it while shopping at [employer] 147/90. C/o cielo lower leg tingling. Speaking in full sentences. Oriented. Denies CP. C/o feeling lightheaded/fatigued. Denies n/v/d. C/o excessive belching.

## 2025-05-02 NOTE — Clinical Note
Magda Jaimes was seen and treated in our emergency department on 5/2/2025.  She may return to work on 05/08/2025.       If you have any questions or concerns, please don't hesitate to call.      Mee Pace PA-C

## 2025-05-05 LAB
ATRIAL RATE: 66 BPM
P AXIS: 48 DEGREES
P OFFSET: 185 MS
P ONSET: 126 MS
PR INTERVAL: 188 MS
Q ONSET: 220 MS
QRS COUNT: 10 BEATS
QRS DURATION: 84 MS
QT INTERVAL: 428 MS
QTC CALCULATION(BAZETT): 448 MS
QTC FREDERICIA: 442 MS
R AXIS: -7 DEGREES
T AXIS: 24 DEGREES
T OFFSET: 434 MS
VENTRICULAR RATE: 66 BPM

## 2025-05-13 ENCOUNTER — TELEPHONE (OUTPATIENT)
Dept: PRIMARY CARE | Facility: CLINIC | Age: 65
End: 2025-05-13
Payer: COMMERCIAL

## 2025-05-13 DIAGNOSIS — G47.10 HYPERSOMNOLENCE: ICD-10-CM

## 2025-05-13 NOTE — TELEPHONE ENCOUNTER
Order signed.   Recommend she follow up sooner than June due to ED visit. Needs medication changes.

## 2025-05-13 NOTE — TELEPHONE ENCOUNTER
Patient requesting a referral to see a [facility] due to her sx of sleep apnea. Hypersomnolence, snoring, and observed apneas ( when patient was getting her eye surgery )

## 2025-05-16 ENCOUNTER — OFFICE VISIT (OUTPATIENT)
Dept: PRIMARY CARE | Facility: CLINIC | Age: 65
End: 2025-05-16
Payer: COMMERCIAL

## 2025-05-16 VITALS
HEIGHT: 66 IN | WEIGHT: 194 LBS | BODY MASS INDEX: 31.18 KG/M2 | DIASTOLIC BLOOD PRESSURE: 82 MMHG | OXYGEN SATURATION: 99 % | HEART RATE: 73 BPM | SYSTOLIC BLOOD PRESSURE: 160 MMHG

## 2025-05-16 DIAGNOSIS — E87.1 HYPONATREMIA: ICD-10-CM

## 2025-05-16 DIAGNOSIS — I10 ESSENTIAL HYPERTENSION, BENIGN: Primary | ICD-10-CM

## 2025-05-16 PROCEDURE — 3077F SYST BP >= 140 MM HG: CPT | Performed by: PHYSICIAN ASSISTANT

## 2025-05-16 PROCEDURE — 3079F DIAST BP 80-89 MM HG: CPT | Performed by: PHYSICIAN ASSISTANT

## 2025-05-16 PROCEDURE — 3008F BODY MASS INDEX DOCD: CPT | Performed by: PHYSICIAN ASSISTANT

## 2025-05-16 PROCEDURE — 99214 OFFICE O/P EST MOD 30 MIN: CPT | Performed by: PHYSICIAN ASSISTANT

## 2025-05-16 PROCEDURE — 1036F TOBACCO NON-USER: CPT | Performed by: PHYSICIAN ASSISTANT

## 2025-05-16 RX ORDER — LISINOPRIL 20 MG/1
20 TABLET ORAL DAILY
Qty: 90 TABLET | Refills: 1 | Status: SHIPPED | OUTPATIENT
Start: 2025-05-16 | End: 2026-06-20

## 2025-05-16 RX ORDER — AMLODIPINE BESYLATE 5 MG/1
5 TABLET ORAL DAILY
Qty: 90 TABLET | Refills: 1 | Status: SHIPPED | OUTPATIENT
Start: 2025-05-16 | End: 2025-11-12

## 2025-05-16 ASSESSMENT — PAIN SCALES - GENERAL: PAINLEVEL_OUTOF10: 0-NO PAIN

## 2025-05-16 NOTE — PATIENT INSTRUCTIONS
Nasacort in the AM  Saline in the PM    Home sleep test .  Discontinue lisinopril, add amlodipine.   Follow up 6 weeks

## 2025-05-16 NOTE — PROGRESS NOTES
Subjective   Patient ID: Magda Jaimes is a 64 y.o. female who presents for Follow-up ([facility]).    Magda is seen for follow up - states that she felt presyncopal after a period of working long hours and sweating. Labs and EKG reviewed. She developed electrolyte disturbance.     Below is the patient's most recent value for Albumin, ALT, AST, BUN, Calcium, Chloride, Cholesterol, CO2, Creatinine, GFR, Glucose, HDL, Hematocrit, Hemoglobin, Hemoglobin A1C, LDL, Magnesium, Phosphorus, Platelets, Potassium, PSA, Sodium, Triglycerides, and WBC.   Lab Results       Component                Value               Date                       ALBUMIN                  4.5                 05/02/2025                 ALT                      19                  05/02/2025                 AST                      22                  05/02/2025                 BUN                      9                   05/02/2025                 CALCIUM                  9.1                 05/02/2025                 CL                       91 (L)              05/02/2025                 CHOL                     218 (H)             04/25/2025                 CO2                      28                  05/02/2025                 CREATININE               0.54                05/02/2025                 HDL                      51                  04/25/2025                 HCT                      38.8                05/02/2025                 HGB                      13.9                05/02/2025                 HGBA1C                   6.2 (H)             04/25/2025                 MG                       1.74                05/02/2025                 PLT                      258                 05/02/2025                 K                        3.3 (L)             05/02/2025                 NA                       127 (L)             05/02/2025                 TRIG                     230 (H)             04/25/2025                 WBC   "                    6.9                 05/02/2025            Note: for a comprehensive list of the patient's lab results, access the Results Review activity.    She also had recent eye surgery and was informed she may have breathing issues when she sleeps - reports chronic nasal congestion. Uses saline and has not had sleep study. Prefers home test.          Review of Systems   All other systems reviewed and are negative.      Objective   /82   Pulse 73   Ht 1.676 m (5' 6\")   Wt 88 kg (194 lb)   SpO2 99%   BMI 31.31 kg/m²     Physical Exam  Constitutional:       Appearance: Normal appearance.   Cardiovascular:      Rate and Rhythm: Normal rate and regular rhythm.   Musculoskeletal:      Right lower leg: No edema.      Left lower leg: No edema.   Neurological:      Mental Status: She is alert.         Assessment/Plan   Diagnoses and all orders for this visit:  Essential hypertension, benign  -     lisinopril 20 mg tablet; Take 1 tablet (20 mg) by mouth once daily.  -     amLODIPine (Norvasc) 5 mg tablet; Take 1 tablet (5 mg) by mouth once daily.  Hyponatremia  -     Comprehensive metabolic panel; Future         "

## 2025-05-19 ENCOUNTER — TELEPHONE (OUTPATIENT)
Dept: PRIMARY CARE | Facility: CLINIC | Age: 65
End: 2025-05-19
Payer: COMMERCIAL

## 2025-05-19 ENCOUNTER — OFFICE VISIT (OUTPATIENT)
Dept: PRIMARY CARE | Facility: CLINIC | Age: 65
End: 2025-05-19
Payer: COMMERCIAL

## 2025-05-19 VITALS
HEART RATE: 90 BPM | OXYGEN SATURATION: 99 % | BODY MASS INDEX: 30.7 KG/M2 | HEIGHT: 66 IN | DIASTOLIC BLOOD PRESSURE: 80 MMHG | SYSTOLIC BLOOD PRESSURE: 150 MMHG | WEIGHT: 191 LBS

## 2025-05-19 DIAGNOSIS — I10 ESSENTIAL HYPERTENSION, BENIGN: Primary | ICD-10-CM

## 2025-05-19 PROCEDURE — 99214 OFFICE O/P EST MOD 30 MIN: CPT | Performed by: PHYSICIAN ASSISTANT

## 2025-05-19 PROCEDURE — 3079F DIAST BP 80-89 MM HG: CPT | Performed by: PHYSICIAN ASSISTANT

## 2025-05-19 PROCEDURE — 93000 ELECTROCARDIOGRAM COMPLETE: CPT | Performed by: PHYSICIAN ASSISTANT

## 2025-05-19 PROCEDURE — 1036F TOBACCO NON-USER: CPT | Performed by: PHYSICIAN ASSISTANT

## 2025-05-19 PROCEDURE — 3077F SYST BP >= 140 MM HG: CPT | Performed by: PHYSICIAN ASSISTANT

## 2025-05-19 PROCEDURE — 3008F BODY MASS INDEX DOCD: CPT | Performed by: PHYSICIAN ASSISTANT

## 2025-05-19 RX ORDER — NEBIVOLOL 5 MG/1
5 TABLET ORAL DAILY
Qty: 30 TABLET | Refills: 1 | Status: SHIPPED | OUTPATIENT
Start: 2025-05-19

## 2025-05-19 ASSESSMENT — PATIENT HEALTH QUESTIONNAIRE - PHQ9
SUM OF ALL RESPONSES TO PHQ9 QUESTIONS 1 AND 2: 0
2. FEELING DOWN, DEPRESSED OR HOPELESS: NOT AT ALL
1. LITTLE INTEREST OR PLEASURE IN DOING THINGS: NOT AT ALL

## 2025-05-19 NOTE — TELEPHONE ENCOUNTER
Is she having any physical effect? Would recommend continuing this medication as it takes some time to work. Could do 1/2 tab for a couple days too.

## 2025-05-19 NOTE — TELEPHONE ENCOUNTER
"Pt called stating she had a \"horrific reaction to a new medication\"    Pt believes it made her blood pressure higher, not lower. Pts blood pressure reading this mornin/98    New medication is amLODIPine 5 mg.  "

## 2025-05-19 NOTE — PROGRESS NOTES
"Subjective   Patient ID: Magda Jaimes is a 64 y.o. female who presents for Sick Visit.    Presents for follow up - states that she has had tightness in chest, flushed, chills episode yesterday. . Has changed lisinopril-hydrochlorothiazide to lisinopril and amlodipine 2 days ago. Felt that she was also having agitation, weird feelings. Orma like she was not herself - felt numb and does not want to continue the medication.   She denies symptoms today.     BP remains elevated.          Review of Systems   All other systems reviewed and are negative.      Objective   /80   Pulse 90   Ht 1.676 m (5' 6\")   Wt 86.6 kg (191 lb)   SpO2 99%   BMI 30.83 kg/m²     Physical Exam  Constitutional:       Appearance: Normal appearance.   HENT:      Right Ear: Tympanic membrane and ear canal normal.      Left Ear: Ear canal normal.      Nose: Nose normal.      Mouth/Throat:      Pharynx: Oropharynx is clear.   Eyes:      Pupils: Pupils are equal, round, and reactive to light.   Cardiovascular:      Rate and Rhythm: Normal rate and regular rhythm.   Lymphadenopathy:      Cervical: No cervical adenopathy.   Neurological:      Mental Status: She is alert.         Assessment/Plan   Diagnoses and all orders for this visit:  Essential hypertension, benign  -     ECG 12 lead (Clinic Performed)  -     nebivolol (Bystolic) 5 mg tablet; Take 1 tablet (5 mg) by mouth once daily.  DC amlodipine; trial nebivolol.   Follow up in 4 weeks       "

## 2025-05-20 ENCOUNTER — TELEPHONE (OUTPATIENT)
Dept: PRIMARY CARE | Facility: CLINIC | Age: 65
End: 2025-05-20
Payer: COMMERCIAL

## 2025-05-20 NOTE — TELEPHONE ENCOUNTER
Pt stated she is experiencing a lot of swelling all over and is asking for a referral to a cardiologist. Pt also asking for a water pill to help until she can get into cardiologist. Please advise.     Requesting call from MA to let pt know if referral is placed.

## 2025-05-20 NOTE — TELEPHONE ENCOUNTER
Can call Dr. River in [place].   Do not recommend diuretic until her sodium is back to normal.   Recommend avoiding salty foods as well.

## 2025-05-22 NOTE — TELEPHONE ENCOUNTER
Spoke with patient who verbally understands. Patient is going to hold off on calling Dr River currently.

## 2025-06-09 ENCOUNTER — TELEPHONE (OUTPATIENT)
Dept: PRIMARY CARE | Facility: CLINIC | Age: 65
End: 2025-06-09

## 2025-06-09 DIAGNOSIS — J32.9 SINUSITIS, UNSPECIFIED CHRONICITY, UNSPECIFIED LOCATION: Primary | ICD-10-CM

## 2025-06-10 NOTE — TELEPHONE ENCOUNTER
Patient called again and asked if there is something she can take for her sinuses.  She is using a saline nasal spray but she said it's getting to the point where her teeth are now beginning to hurt.  She is at work and unable to come in for an appointment.

## 2025-06-11 RX ORDER — AMOXICILLIN 875 MG/1
875 TABLET, COATED ORAL 2 TIMES DAILY
Qty: 20 TABLET | Refills: 0 | Status: SHIPPED | OUTPATIENT
Start: 2025-06-11 | End: 2025-06-25 | Stop reason: WASHOUT

## 2025-06-13 ENCOUNTER — APPOINTMENT (OUTPATIENT)
Dept: SLEEP MEDICINE | Facility: CLINIC | Age: 65
End: 2025-06-13
Payer: COMMERCIAL

## 2025-06-16 DIAGNOSIS — E87.1 HYPONATREMIA: ICD-10-CM

## 2025-06-22 ENCOUNTER — PATIENT MESSAGE (OUTPATIENT)
Dept: PRIMARY CARE | Facility: CLINIC | Age: 65
End: 2025-06-22
Payer: COMMERCIAL

## 2025-06-24 LAB
ALBUMIN SERPL-MCNC: 4.6 G/DL (ref 3.6–5.1)
ALP SERPL-CCNC: 57 U/L (ref 37–153)
ALT SERPL-CCNC: 21 U/L (ref 6–29)
ANION GAP SERPL CALCULATED.4IONS-SCNC: 8 MMOL/L (CALC) (ref 7–17)
AST SERPL-CCNC: 22 U/L (ref 10–35)
BILIRUB SERPL-MCNC: 0.9 MG/DL (ref 0.2–1.2)
BUN SERPL-MCNC: 8 MG/DL (ref 7–25)
CALCIUM SERPL-MCNC: 9.5 MG/DL (ref 8.6–10.4)
CHLORIDE SERPL-SCNC: 98 MMOL/L (ref 98–110)
CO2 SERPL-SCNC: 27 MMOL/L (ref 20–32)
CREAT SERPL-MCNC: 0.61 MG/DL (ref 0.5–1.05)
EGFRCR SERPLBLD CKD-EPI 2021: 100 ML/MIN/1.73M2
GLUCOSE SERPL-MCNC: 106 MG/DL (ref 65–99)
POTASSIUM SERPL-SCNC: 4.1 MMOL/L (ref 3.5–5.3)
PROT SERPL-MCNC: 7.6 G/DL (ref 6.1–8.1)
SODIUM SERPL-SCNC: 133 MMOL/L (ref 135–146)

## 2025-06-25 ENCOUNTER — OFFICE VISIT (OUTPATIENT)
Dept: PRIMARY CARE | Facility: CLINIC | Age: 65
End: 2025-06-25
Payer: COMMERCIAL

## 2025-06-25 VITALS
DIASTOLIC BLOOD PRESSURE: 82 MMHG | WEIGHT: 184 LBS | BODY MASS INDEX: 29.57 KG/M2 | HEIGHT: 66 IN | HEART RATE: 75 BPM | SYSTOLIC BLOOD PRESSURE: 138 MMHG | OXYGEN SATURATION: 97 %

## 2025-06-25 DIAGNOSIS — I10 ESSENTIAL HYPERTENSION, BENIGN: ICD-10-CM

## 2025-06-25 DIAGNOSIS — E78.2 MIXED HYPERLIPIDEMIA: ICD-10-CM

## 2025-06-25 DIAGNOSIS — Z00.00 PHYSICAL EXAM: Primary | ICD-10-CM

## 2025-06-25 DIAGNOSIS — R73.03 PRE-DIABETES: ICD-10-CM

## 2025-06-25 DIAGNOSIS — E87.1 HYPONATREMIA: ICD-10-CM

## 2025-06-25 PROCEDURE — 99396 PREV VISIT EST AGE 40-64: CPT | Performed by: PHYSICIAN ASSISTANT

## 2025-06-25 PROCEDURE — 3075F SYST BP GE 130 - 139MM HG: CPT | Performed by: PHYSICIAN ASSISTANT

## 2025-06-25 PROCEDURE — 3079F DIAST BP 80-89 MM HG: CPT | Performed by: PHYSICIAN ASSISTANT

## 2025-06-25 PROCEDURE — 1036F TOBACCO NON-USER: CPT | Performed by: PHYSICIAN ASSISTANT

## 2025-06-25 PROCEDURE — 3008F BODY MASS INDEX DOCD: CPT | Performed by: PHYSICIAN ASSISTANT

## 2025-06-25 RX ORDER — SPIRONOLACTONE 25 MG/1
25 TABLET ORAL DAILY
Qty: 30 TABLET | Refills: 1 | Status: SHIPPED | OUTPATIENT
Start: 2025-06-25 | End: 2025-12-22

## 2025-06-25 ASSESSMENT — PAIN SCALES - GENERAL: PAINLEVEL_OUTOF10: 0-NO PAIN

## 2025-06-25 ASSESSMENT — PATIENT HEALTH QUESTIONNAIRE - PHQ9
1. LITTLE INTEREST OR PLEASURE IN DOING THINGS: NOT AT ALL
SUM OF ALL RESPONSES TO PHQ9 QUESTIONS 1 AND 2: 0
2. FEELING DOWN, DEPRESSED OR HOPELESS: NOT AT ALL

## 2025-06-25 NOTE — PROGRESS NOTES
"Subjective   Patient ID: Magda Jaimes is a 64 y.o. female who presents for Annual Exam.    Presents for RPE.     States that she has had episodic numbness in feet which started after riding her bike. Also numbness in her hands at times.   Labss how improved sodium level though still mildly decreased.   Does have excessive sweating at work. Using electrolyte freezer pops.   Reading at pharmacy 120/69.  Would like to try another diuretic as she is retaining water. Prior use of hydrochlorothiazide provoked hyponatremia.    Mammogram normal last month.   CRC up to date.          Review of Systems   All other systems reviewed and are negative.      Objective   /82   Pulse 75   Ht 1.676 m (5' 6\")   Wt 83.5 kg (184 lb)   SpO2 97%   BMI 29.70 kg/m²     Physical Exam  Constitutional:       Appearance: Normal appearance.   HENT:      Right Ear: Tympanic membrane normal.      Left Ear: Tympanic membrane normal.      Mouth/Throat:      Pharynx: Oropharynx is clear.   Eyes:      Pupils: Pupils are equal, round, and reactive to light.   Cardiovascular:      Rate and Rhythm: Normal rate and regular rhythm.      Heart sounds: Murmur heard.   Musculoskeletal:      Comments: Trace edema   Neurological:      Mental Status: She is alert.         Assessment/Plan   Diagnoses and all orders for this visit:  Physical exam  Essential hypertension, benign  -     spironolactone (Aldactone) 25 mg tablet; Take 1 tablet (25 mg) by mouth once daily.  -     Comprehensive metabolic panel; Future  Mixed hyperlipidemia  Pre-diabetes  Hyponatremia  -     Comprehensive metabolic panel; Future    Follow up 6 weeks.        "

## 2025-06-26 ENCOUNTER — PATIENT MESSAGE (OUTPATIENT)
Dept: PRIMARY CARE | Facility: CLINIC | Age: 65
End: 2025-06-26
Payer: COMMERCIAL

## 2025-06-27 ENCOUNTER — APPOINTMENT (OUTPATIENT)
Dept: PRIMARY CARE | Facility: CLINIC | Age: 65
End: 2025-06-27
Payer: COMMERCIAL

## 2025-07-09 LAB
ALBUMIN SERPL-MCNC: 4.8 G/DL (ref 3.6–5.1)
ALP SERPL-CCNC: 66 U/L (ref 37–153)
ALT SERPL-CCNC: 34 U/L (ref 6–29)
ANION GAP SERPL CALCULATED.4IONS-SCNC: 11 MMOL/L (CALC) (ref 7–17)
AST SERPL-CCNC: 29 U/L (ref 10–35)
BILIRUB SERPL-MCNC: 0.9 MG/DL (ref 0.2–1.2)
BUN SERPL-MCNC: 11 MG/DL (ref 7–25)
CALCIUM SERPL-MCNC: 9.6 MG/DL (ref 8.6–10.4)
CHLORIDE SERPL-SCNC: 94 MMOL/L (ref 98–110)
CO2 SERPL-SCNC: 25 MMOL/L (ref 20–32)
CREAT SERPL-MCNC: 0.6 MG/DL (ref 0.5–1.05)
EGFRCR SERPLBLD CKD-EPI 2021: 100 ML/MIN/1.73M2
GLUCOSE SERPL-MCNC: 119 MG/DL (ref 65–99)
POTASSIUM SERPL-SCNC: 4.3 MMOL/L (ref 3.5–5.3)
PROT SERPL-MCNC: 7.7 G/DL (ref 6.1–8.1)
SODIUM SERPL-SCNC: 130 MMOL/L (ref 135–146)

## 2025-07-11 ENCOUNTER — OFFICE VISIT (OUTPATIENT)
Dept: PRIMARY CARE | Facility: CLINIC | Age: 65
End: 2025-07-11
Payer: COMMERCIAL

## 2025-07-11 VITALS
BODY MASS INDEX: 29.25 KG/M2 | WEIGHT: 182 LBS | OXYGEN SATURATION: 97 % | DIASTOLIC BLOOD PRESSURE: 90 MMHG | SYSTOLIC BLOOD PRESSURE: 150 MMHG | HEART RATE: 83 BPM | HEIGHT: 66 IN

## 2025-07-11 DIAGNOSIS — I10 ESSENTIAL HYPERTENSION, BENIGN: Primary | ICD-10-CM

## 2025-07-11 DIAGNOSIS — R06.00 DYSPNEA, UNSPECIFIED TYPE: ICD-10-CM

## 2025-07-11 DIAGNOSIS — R73.03 PRE-DIABETES: ICD-10-CM

## 2025-07-11 DIAGNOSIS — E87.1 HYPONATREMIA: ICD-10-CM

## 2025-07-11 PROCEDURE — 3077F SYST BP >= 140 MM HG: CPT | Performed by: PHYSICIAN ASSISTANT

## 2025-07-11 PROCEDURE — 3080F DIAST BP >= 90 MM HG: CPT | Performed by: PHYSICIAN ASSISTANT

## 2025-07-11 PROCEDURE — 99214 OFFICE O/P EST MOD 30 MIN: CPT | Performed by: PHYSICIAN ASSISTANT

## 2025-07-11 PROCEDURE — 1036F TOBACCO NON-USER: CPT | Performed by: PHYSICIAN ASSISTANT

## 2025-07-11 PROCEDURE — 3008F BODY MASS INDEX DOCD: CPT | Performed by: PHYSICIAN ASSISTANT

## 2025-07-11 RX ORDER — LISINOPRIL 30 MG/1
30 TABLET ORAL DAILY
Qty: 30 TABLET | Refills: 2 | Status: SHIPPED | OUTPATIENT
Start: 2025-07-11 | End: 2026-08-15

## 2025-07-11 ASSESSMENT — PAIN SCALES - GENERAL: PAINLEVEL_OUTOF10: 0-NO PAIN

## 2025-07-16 LAB
CREAT UR-MCNC: 62 MG/DL (ref 20–275)
OSMOLALITY UR: 274 MOSM/KG (ref 50–1200)
SODIUM UR-SCNC: 18 MMOL/L (ref 28–272)
SODIUM/CREAT UR-SRTO: 29 MMOL/G CREAT (ref 28–280)

## 2025-07-25 ENCOUNTER — HOSPITAL ENCOUNTER (OUTPATIENT)
Dept: RADIOLOGY | Facility: HOSPITAL | Age: 65
Discharge: HOME | End: 2025-07-25
Payer: COMMERCIAL

## 2025-07-25 DIAGNOSIS — R06.02 SHORTNESS OF BREATH: ICD-10-CM

## 2025-07-25 DIAGNOSIS — E87.1 HYPONATREMIA: ICD-10-CM

## 2025-07-25 DIAGNOSIS — I10 ESSENTIAL HYPERTENSION, BENIGN: ICD-10-CM

## 2025-07-25 DIAGNOSIS — E78.5 HYPERLIPIDEMIA, UNSPECIFIED: ICD-10-CM

## 2025-07-25 DIAGNOSIS — I10 ESSENTIAL (PRIMARY) HYPERTENSION: ICD-10-CM

## 2025-07-25 PROCEDURE — 75571 CT HRT W/O DYE W/CA TEST: CPT

## 2025-07-28 ENCOUNTER — APPOINTMENT (OUTPATIENT)
Dept: CARDIOLOGY | Facility: HOSPITAL | Age: 65
End: 2025-07-28
Payer: COMMERCIAL

## 2025-07-28 ENCOUNTER — HOSPITAL ENCOUNTER (OUTPATIENT)
Facility: HOSPITAL | Age: 65
Setting detail: OBSERVATION
Discharge: HOME | End: 2025-07-31
Attending: STUDENT IN AN ORGANIZED HEALTH CARE EDUCATION/TRAINING PROGRAM | Admitting: STUDENT IN AN ORGANIZED HEALTH CARE EDUCATION/TRAINING PROGRAM
Payer: COMMERCIAL

## 2025-07-28 ENCOUNTER — APPOINTMENT (OUTPATIENT)
Dept: RADIOLOGY | Facility: HOSPITAL | Age: 65
End: 2025-07-28
Payer: COMMERCIAL

## 2025-07-28 DIAGNOSIS — R07.9 CHEST PAIN, UNSPECIFIED TYPE: ICD-10-CM

## 2025-07-28 DIAGNOSIS — R55 SYNCOPE, UNSPECIFIED SYNCOPE TYPE: Primary | ICD-10-CM

## 2025-07-28 DIAGNOSIS — R60.0 BILATERAL LOWER EXTREMITY EDEMA: ICD-10-CM

## 2025-07-28 DIAGNOSIS — I25.110 ATHEROSCLEROTIC HEART DISEASE OF NATIVE CORONARY ARTERY WITH UNSTABLE ANGINA PECTORIS: ICD-10-CM

## 2025-07-28 LAB
ALBUMIN SERPL BCP-MCNC: 4.1 G/DL (ref 3.4–5)
ALP SERPL-CCNC: 49 U/L (ref 33–136)
ALT SERPL W P-5'-P-CCNC: 27 U/L (ref 7–45)
ANION GAP SERPL CALC-SCNC: 14 MMOL/L (ref 10–20)
APTT PPP: 24 SECONDS (ref 26–36)
AST SERPL W P-5'-P-CCNC: 25 U/L (ref 9–39)
BASOPHILS # BLD AUTO: 0.02 X10*3/UL (ref 0–0.1)
BASOPHILS NFR BLD AUTO: 0.3 %
BILIRUB SERPL-MCNC: 1.1 MG/DL (ref 0–1.2)
BUN SERPL-MCNC: 6 MG/DL (ref 6–23)
CALCIUM SERPL-MCNC: 8.9 MG/DL (ref 8.6–10.3)
CARDIAC TROPONIN I PNL SERPL HS: 4 NG/L (ref 0–13)
CARDIAC TROPONIN I PNL SERPL HS: <3 NG/L (ref 0–13)
CHLORIDE SERPL-SCNC: 97 MMOL/L (ref 98–107)
CO2 SERPL-SCNC: 25 MMOL/L (ref 21–32)
CREAT SERPL-MCNC: 0.72 MG/DL (ref 0.5–1.05)
EGFRCR SERPLBLD CKD-EPI 2021: >90 ML/MIN/1.73M*2
EOSINOPHIL # BLD AUTO: 0.02 X10*3/UL (ref 0–0.7)
EOSINOPHIL NFR BLD AUTO: 0.3 %
ERYTHROCYTE [DISTWIDTH] IN BLOOD BY AUTOMATED COUNT: 11.9 % (ref 11.5–14.5)
GLUCOSE SERPL-MCNC: 137 MG/DL (ref 74–99)
HCT VFR BLD AUTO: 38.9 % (ref 36–46)
HGB BLD-MCNC: 13.9 G/DL (ref 12–16)
IMM GRANULOCYTES # BLD AUTO: 0.01 X10*3/UL (ref 0–0.7)
IMM GRANULOCYTES NFR BLD AUTO: 0.2 % (ref 0–0.9)
INR PPP: 1.1 (ref 0.9–1.1)
LYMPHOCYTES # BLD AUTO: 1.73 X10*3/UL (ref 1.2–4.8)
LYMPHOCYTES NFR BLD AUTO: 30 %
MCH RBC QN AUTO: 31.7 PG (ref 26–34)
MCHC RBC AUTO-ENTMCNC: 35.7 G/DL (ref 32–36)
MCV RBC AUTO: 89 FL (ref 80–100)
MONOCYTES # BLD AUTO: 0.46 X10*3/UL (ref 0.1–1)
MONOCYTES NFR BLD AUTO: 8 %
NEUTROPHILS # BLD AUTO: 3.52 X10*3/UL (ref 1.2–7.7)
NEUTROPHILS NFR BLD AUTO: 61.2 %
NRBC BLD-RTO: 0 /100 WBCS (ref 0–0)
PLATELET # BLD AUTO: 246 X10*3/UL (ref 150–450)
POTASSIUM SERPL-SCNC: 3.7 MMOL/L (ref 3.5–5.3)
PROT SERPL-MCNC: 6.7 G/DL (ref 6.4–8.2)
PROTHROMBIN TIME: 12.4 SECONDS (ref 9.8–12.4)
RBC # BLD AUTO: 4.39 X10*6/UL (ref 4–5.2)
SODIUM SERPL-SCNC: 132 MMOL/L (ref 136–145)
WBC # BLD AUTO: 5.8 X10*3/UL (ref 4.4–11.3)

## 2025-07-28 PROCEDURE — 80053 COMPREHEN METABOLIC PANEL: CPT | Performed by: STUDENT IN AN ORGANIZED HEALTH CARE EDUCATION/TRAINING PROGRAM

## 2025-07-28 PROCEDURE — 70551 MRI BRAIN STEM W/O DYE: CPT | Performed by: RADIOLOGY

## 2025-07-28 PROCEDURE — 2500000001 HC RX 250 WO HCPCS SELF ADMINISTERED DRUGS (ALT 637 FOR MEDICARE OP): Performed by: STUDENT IN AN ORGANIZED HEALTH CARE EDUCATION/TRAINING PROGRAM

## 2025-07-28 PROCEDURE — 2550000001 HC RX 255 CONTRASTS: Performed by: STUDENT IN AN ORGANIZED HEALTH CARE EDUCATION/TRAINING PROGRAM

## 2025-07-28 PROCEDURE — 85610 PROTHROMBIN TIME: CPT | Performed by: STUDENT IN AN ORGANIZED HEALTH CARE EDUCATION/TRAINING PROGRAM

## 2025-07-28 PROCEDURE — 93005 ELECTROCARDIOGRAM TRACING: CPT

## 2025-07-28 PROCEDURE — 36415 COLL VENOUS BLD VENIPUNCTURE: CPT | Performed by: STUDENT IN AN ORGANIZED HEALTH CARE EDUCATION/TRAINING PROGRAM

## 2025-07-28 PROCEDURE — 70551 MRI BRAIN STEM W/O DYE: CPT

## 2025-07-28 PROCEDURE — G0378 HOSPITAL OBSERVATION PER HR: HCPCS

## 2025-07-28 PROCEDURE — 84484 ASSAY OF TROPONIN QUANT: CPT | Performed by: STUDENT IN AN ORGANIZED HEALTH CARE EDUCATION/TRAINING PROGRAM

## 2025-07-28 PROCEDURE — 99223 1ST HOSP IP/OBS HIGH 75: CPT | Performed by: STUDENT IN AN ORGANIZED HEALTH CARE EDUCATION/TRAINING PROGRAM

## 2025-07-28 PROCEDURE — 70498 CT ANGIOGRAPHY NECK: CPT

## 2025-07-28 PROCEDURE — 70450 CT HEAD/BRAIN W/O DYE: CPT

## 2025-07-28 PROCEDURE — 85025 COMPLETE CBC W/AUTO DIFF WBC: CPT | Performed by: STUDENT IN AN ORGANIZED HEALTH CARE EDUCATION/TRAINING PROGRAM

## 2025-07-28 PROCEDURE — 85730 THROMBOPLASTIN TIME PARTIAL: CPT | Performed by: STUDENT IN AN ORGANIZED HEALTH CARE EDUCATION/TRAINING PROGRAM

## 2025-07-28 PROCEDURE — 99285 EMERGENCY DEPT VISIT HI MDM: CPT | Mod: 25 | Performed by: STUDENT IN AN ORGANIZED HEALTH CARE EDUCATION/TRAINING PROGRAM

## 2025-07-28 PROCEDURE — 2500000002 HC RX 250 W HCPCS SELF ADMINISTERED DRUGS (ALT 637 FOR MEDICARE OP, ALT 636 FOR OP/ED): Performed by: STUDENT IN AN ORGANIZED HEALTH CARE EDUCATION/TRAINING PROGRAM

## 2025-07-28 PROCEDURE — 70450 CT HEAD/BRAIN W/O DYE: CPT | Performed by: STUDENT IN AN ORGANIZED HEALTH CARE EDUCATION/TRAINING PROGRAM

## 2025-07-28 PROCEDURE — 70498 CT ANGIOGRAPHY NECK: CPT | Performed by: STUDENT IN AN ORGANIZED HEALTH CARE EDUCATION/TRAINING PROGRAM

## 2025-07-28 PROCEDURE — G0426 INPT/ED TELECONSULT50: HCPCS | Performed by: STUDENT IN AN ORGANIZED HEALTH CARE EDUCATION/TRAINING PROGRAM

## 2025-07-28 PROCEDURE — 70496 CT ANGIOGRAPHY HEAD: CPT | Performed by: STUDENT IN AN ORGANIZED HEALTH CARE EDUCATION/TRAINING PROGRAM

## 2025-07-28 RX ORDER — ASPIRIN 81 MG/1
81 TABLET ORAL DAILY
Status: DISCONTINUED | OUTPATIENT
Start: 2025-07-28 | End: 2025-07-31 | Stop reason: HOSPADM

## 2025-07-28 RX ORDER — ROSUVASTATIN CALCIUM 10 MG/1
10 TABLET, COATED ORAL DAILY
Status: DISCONTINUED | OUTPATIENT
Start: 2025-07-28 | End: 2025-07-31 | Stop reason: HOSPADM

## 2025-07-28 RX ORDER — ASPIRIN 325 MG
325 TABLET ORAL DAILY PRN
COMMUNITY
End: 2025-07-31 | Stop reason: HOSPADM

## 2025-07-28 RX ORDER — ASPIRIN 81 MG/1
81 TABLET ORAL DAILY PRN
COMMUNITY
End: 2025-07-31 | Stop reason: HOSPADM

## 2025-07-28 RX ADMIN — ASPIRIN 81 MG: 81 TABLET, DELAYED RELEASE ORAL at 15:22

## 2025-07-28 RX ADMIN — ROSUVASTATIN CALCIUM 10 MG: 10 TABLET, FILM COATED ORAL at 15:22

## 2025-07-28 RX ADMIN — IOHEXOL 75 ML: 350 INJECTION, SOLUTION INTRAVENOUS at 09:33

## 2025-07-28 SDOH — SOCIAL STABILITY: SOCIAL INSECURITY: WITHIN THE LAST YEAR, HAVE YOU BEEN HUMILIATED OR EMOTIONALLY ABUSED IN OTHER WAYS BY YOUR PARTNER OR EX-PARTNER?: NO

## 2025-07-28 SDOH — ECONOMIC STABILITY: FOOD INSECURITY: WITHIN THE PAST 12 MONTHS, THE FOOD YOU BOUGHT JUST DIDN'T LAST AND YOU DIDN'T HAVE MONEY TO GET MORE.: NEVER TRUE

## 2025-07-28 SDOH — SOCIAL STABILITY: SOCIAL INSECURITY: DOES ANYONE TRY TO KEEP YOU FROM HAVING/CONTACTING OTHER FRIENDS OR DOING THINGS OUTSIDE YOUR HOME?: NO

## 2025-07-28 SDOH — HEALTH STABILITY: MENTAL HEALTH: HOW MANY DRINKS CONTAINING ALCOHOL DO YOU HAVE ON A TYPICAL DAY WHEN YOU ARE DRINKING?: PATIENT DOES NOT DRINK

## 2025-07-28 SDOH — HEALTH STABILITY: MENTAL HEALTH: HOW OFTEN DO YOU HAVE SIX OR MORE DRINKS ON ONE OCCASION?: NEVER

## 2025-07-28 SDOH — SOCIAL STABILITY: SOCIAL INSECURITY
WITHIN THE LAST YEAR, HAVE YOU BEEN KICKED, HIT, SLAPPED, OR OTHERWISE PHYSICALLY HURT BY YOUR PARTNER OR EX-PARTNER?: NO

## 2025-07-28 SDOH — SOCIAL STABILITY: SOCIAL INSECURITY: HAS ANYONE EVER THREATENED TO HURT YOUR FAMILY OR YOUR PETS?: NO

## 2025-07-28 SDOH — HEALTH STABILITY: MENTAL HEALTH: HOW OFTEN DO YOU HAVE A DRINK CONTAINING ALCOHOL?: NEVER

## 2025-07-28 SDOH — ECONOMIC STABILITY: HOUSING INSECURITY: AT ANY TIME IN THE PAST 12 MONTHS, WERE YOU HOMELESS OR LIVING IN A SHELTER (INCLUDING NOW)?: NO

## 2025-07-28 SDOH — SOCIAL STABILITY: SOCIAL INSECURITY
WITHIN THE LAST YEAR, HAVE YOU BEEN RAPED OR FORCED TO HAVE ANY KIND OF SEXUAL ACTIVITY BY YOUR PARTNER OR EX-PARTNER?: NO

## 2025-07-28 SDOH — SOCIAL STABILITY: SOCIAL INSECURITY: ARE THERE ANY APPARENT SIGNS OF INJURIES/BEHAVIORS THAT COULD BE RELATED TO ABUSE/NEGLECT?: NO

## 2025-07-28 SDOH — ECONOMIC STABILITY: INCOME INSECURITY: IN THE PAST 12 MONTHS HAS THE ELECTRIC, GAS, OIL, OR WATER COMPANY THREATENED TO SHUT OFF SERVICES IN YOUR HOME?: NO

## 2025-07-28 SDOH — SOCIAL STABILITY: SOCIAL INSECURITY: ABUSE: ADULT

## 2025-07-28 SDOH — ECONOMIC STABILITY: FOOD INSECURITY: WITHIN THE PAST 12 MONTHS, YOU WORRIED THAT YOUR FOOD WOULD RUN OUT BEFORE YOU GOT THE MONEY TO BUY MORE.: NEVER TRUE

## 2025-07-28 SDOH — SOCIAL STABILITY: SOCIAL INSECURITY: DO YOU FEEL UNSAFE GOING BACK TO THE PLACE WHERE YOU ARE LIVING?: NO

## 2025-07-28 SDOH — ECONOMIC STABILITY: HOUSING INSECURITY: IN THE PAST 12 MONTHS, HOW MANY TIMES HAVE YOU MOVED WHERE YOU WERE LIVING?: 0

## 2025-07-28 SDOH — SOCIAL STABILITY: SOCIAL INSECURITY: HAVE YOU HAD THOUGHTS OF HARMING ANYONE ELSE?: NO

## 2025-07-28 SDOH — ECONOMIC STABILITY: TRANSPORTATION INSECURITY: IN THE PAST 12 MONTHS, HAS LACK OF TRANSPORTATION KEPT YOU FROM MEDICAL APPOINTMENTS OR FROM GETTING MEDICATIONS?: NO

## 2025-07-28 SDOH — ECONOMIC STABILITY: HOUSING INSECURITY: IN THE LAST 12 MONTHS, WAS THERE A TIME WHEN YOU WERE NOT ABLE TO PAY THE MORTGAGE OR RENT ON TIME?: NO

## 2025-07-28 SDOH — SOCIAL STABILITY: SOCIAL INSECURITY: WITHIN THE LAST YEAR, HAVE YOU BEEN AFRAID OF YOUR PARTNER OR EX-PARTNER?: NO

## 2025-07-28 SDOH — SOCIAL STABILITY: SOCIAL INSECURITY: HAVE YOU HAD ANY THOUGHTS OF HARMING ANYONE ELSE?: NO

## 2025-07-28 SDOH — ECONOMIC STABILITY: FOOD INSECURITY: HOW HARD IS IT FOR YOU TO PAY FOR THE VERY BASICS LIKE FOOD, HOUSING, MEDICAL CARE, AND HEATING?: NOT HARD AT ALL

## 2025-07-28 SDOH — SOCIAL STABILITY: SOCIAL INSECURITY: DO YOU FEEL ANYONE HAS EXPLOITED OR TAKEN ADVANTAGE OF YOU FINANCIALLY OR OF YOUR PERSONAL PROPERTY?: NO

## 2025-07-28 SDOH — SOCIAL STABILITY: SOCIAL INSECURITY: ARE YOU OR HAVE YOU BEEN THREATENED OR ABUSED PHYSICALLY, EMOTIONALLY, OR SEXUALLY BY ANYONE?: NO

## 2025-07-28 SDOH — SOCIAL STABILITY: SOCIAL INSECURITY: WERE YOU ABLE TO COMPLETE ALL THE BEHAVIORAL HEALTH SCREENINGS?: YES

## 2025-07-28 ASSESSMENT — COGNITIVE AND FUNCTIONAL STATUS - GENERAL
DAILY ACTIVITIY SCORE: 24
PATIENT BASELINE BEDBOUND: NO
MOBILITY SCORE: 24
MOBILITY SCORE: 24
DAILY ACTIVITIY SCORE: 24

## 2025-07-28 ASSESSMENT — ENCOUNTER SYMPTOMS
ACTIVITY CHANGE: 1
CHILLS: 0
APPETITE CHANGE: 0
VOMITING: 0
HEADACHES: 0
NAUSEA: 0
SHORTNESS OF BREATH: 1
ABDOMINAL PAIN: 0
FATIGUE: 0
FEVER: 0

## 2025-07-28 ASSESSMENT — LIFESTYLE VARIABLES
AUDIT-C TOTAL SCORE: 0
AUDIT-C TOTAL SCORE: 0
HOW OFTEN DO YOU HAVE 6 OR MORE DRINKS ON ONE OCCASION: NEVER
HOW MANY STANDARD DRINKS CONTAINING ALCOHOL DO YOU HAVE ON A TYPICAL DAY: PATIENT DOES NOT DRINK
HOW OFTEN DO YOU HAVE A DRINK CONTAINING ALCOHOL: NEVER
AUDIT-C TOTAL SCORE: 0
SKIP TO QUESTIONS 9-10: 1
SKIP TO QUESTIONS 9-10: 1

## 2025-07-28 ASSESSMENT — ACTIVITIES OF DAILY LIVING (ADL)
LACK_OF_TRANSPORTATION: NO
HEARING - LEFT EAR: FUNCTIONAL
FEEDING YOURSELF: INDEPENDENT
BATHING: INDEPENDENT
ADEQUATE_TO_COMPLETE_ADL: YES
WALKS IN HOME: INDEPENDENT
DRESSING YOURSELF: INDEPENDENT
JUDGMENT_ADEQUATE_SAFELY_COMPLETE_DAILY_ACTIVITIES: YES
HEARING - RIGHT EAR: FUNCTIONAL
TOILETING: INDEPENDENT
LACK_OF_TRANSPORTATION: NO
PATIENT'S MEMORY ADEQUATE TO SAFELY COMPLETE DAILY ACTIVITIES?: YES
GROOMING: INDEPENDENT

## 2025-07-28 ASSESSMENT — PATIENT HEALTH QUESTIONNAIRE - PHQ9
1. LITTLE INTEREST OR PLEASURE IN DOING THINGS: NOT AT ALL
2. FEELING DOWN, DEPRESSED OR HOPELESS: NOT AT ALL
SUM OF ALL RESPONSES TO PHQ9 QUESTIONS 1 & 2: 0

## 2025-07-28 ASSESSMENT — PAIN SCALES - GENERAL
PAINLEVEL_OUTOF10: 0 - NO PAIN
PAINLEVEL_OUTOF10: 0 - NO PAIN
PAINLEVEL_OUTOF10: 3

## 2025-07-28 ASSESSMENT — PAIN - FUNCTIONAL ASSESSMENT
PAIN_FUNCTIONAL_ASSESSMENT: 0-10

## 2025-07-28 NOTE — PROGRESS NOTES
Pharmacy Medication History Review    Magda Jaimes is a 64 y.o. female admitted for Syncope and collapse. Pharmacy reviewed the patient's [id number] medications and allergies for accuracy.    The list below reflectives the updated PTA list. Please review each medication in order reconciliation for additional clarification and justification.  Prior to Admission Medications   Prescriptions Last Dose Informant Patient Reported? Taking?   aspirin 325 mg tablet Past Week Self Yes Yes   Sig: Take 1 tablet (325 mg) by mouth once daily as needed for mild pain (1 - 3).   aspirin 81 mg EC tablet Past Week Self Yes Yes   Sig: Take 1 tablet (81 mg) by mouth once daily as needed for mild pain (1 - 3).   lisinopril 30 mg tablet 7/27/2025 Morning Self Yes Yes   Sig: Take 1 tablet (30 mg) by mouth once daily.   rosuvastatin (Crestor) 10 mg tablet 7/27/2025 Morning Self Yes Yes   Sig: Take 1 tablet (10 mg) by mouth once daily.      Facility-Administered Medications: None          The list below reflectives the updated allergy list. Please review each documented allergy for additional clarification and justification.  Allergies  Reviewed by Alyssa Soni RN on 7/28/2025        Severity Reactions Comments    Seasonale (91) [levonorgestrel-ethinyl Estrad] Not Specified Other     Amlodipine Low Chills Chest pressure, chills            Below are additional concerns with the patient's PTA list.      AUTUMN DE [place]

## 2025-07-28 NOTE — CONSULTS
"Inpatient consult to Neuro TeleStroke  Consult performed by: Shantanu Barreto MD  Consult ordered by: Suni Crowe DO        Virtual Visit start time: 9:51am    History Of Present Illness:  Historian: Patient and ED Provider   Magda Jaimes is a 64 y.o. female presenting with syncope during stress test.  Last known well: 7:30am     Pt was in her usual state of health, went to stress test this am. As pt was on the treatmil, pt felt chest tightness, and dizziness. Pt does not think she lost consciousness but per report pt did lose consciousness. EMS was called. Patient was noted to be slowly responding, took several attempts to get answer from her. Pt was sent to ED for evaluation. On ED provider evaluation, pt had LUE and LLE drift raising concern for stroke.     CTH was done which was personally reviewed, negative for acute bleed or evolving hypodensity. CTA head and neck was negative for significant stenosis or occlusion on my review.     Pt evaluated by bedside virtually. Pt states returning to baseline. She has BLE cramping sx -- which pt states has been there since May.     Had stroke symptoms resolved at time of presentation: Yes   Current antiplatelet/anticoagulant use: Aspirin    Prior Functional Status (Modified Ck Scale):  0 The patient has no residual symptoms.    Stroke Risk Factors:  Hypertension, Hyperlipidemia, and H/O CAD    Last Recorded Vitals:  Blood pressure 137/80, pulse 83, temperature 37.2 °C (99 °F), temperature source Temporal, resp. rate 18, height 1.676 m (5' 6\"), weight 82.6 kg (182 lb), SpO2 96%.    Physical Exam:  NIHSS    1a  Level of consciousness: 0=alert; keenly responsive   1b. LOC questions:  0=Performs both tasks correctly   1c. LOC commands: 0=Performs both tasks correctly   2.  Best Gaze: 0=normal   3. Visual: 0=No visual loss   4. Facial Palsy: 0=Normal symmetric movement   5a. Motor Left Arm: 0=No drift, limb holds 90 (or 45) degrees for full 10 seconds   5b.  Motor " Right Arm: 0=No drift, limb holds 90 (or 45) degrees for full 10 seconds   6a. Motor Left Le=No drift, limb holds 90 (or 45) degrees for full 10 seconds   6b  Motor Right Le=No drift, limb holds 90 (or 45) degrees for full 10 seconds   7. Limb Ataxia: 0=Absent   8.  Sensory: 0=Normal; no sensory loss   9. Best Language:  0=No aphasia, normal   10. Dysarthria: 0=Normal   11. Extinction and Inattention: 0=No abnormality          Total:   0         NIHSS  initially from ED   1A. Level of Consciousness: 1  1B. Ask Month and Age: 1  1C. Blink Eyes & Squeeze Hands: 0  2. Best Gaze: 0  3. Visual: 0  4. Facial Palsy: 0  5A. Motor - Left Arm: 1  5B. Motor - Right Arm: 0  6A. Motor - Left Le  6B. Motor - Right Le  7. Limb Ataxia: 0  8. Sensory Loss: 0  9. Best Language: 0  10. Dysarthria: 0  11. Extinction and Inattention: 0  NIH Stroke Scale: 4       Relevant Results:  LABS:  Glucose   Date Value Ref Range Status   2025 137 (H) 74 - 99 mg/dL Final     INR   Date Value Ref Range Status   2025 1.1 0.9 - 1.1 Final      Results for orders placed or performed during the hospital encounter of 25 (from the past 24 hours)   CBC and Auto Differential   Result Value Ref Range    WBC 5.8 4.4 - 11.3 x10*3/uL    nRBC 0.0 0.0 - 0.0 /100 WBCs    RBC 4.39 4.00 - 5.20 x10*6/uL    Hemoglobin 13.9 12.0 - 16.0 g/dL    Hematocrit 38.9 36.0 - 46.0 %    MCV 89 80 - 100 fL    MCH 31.7 26.0 - 34.0 pg    MCHC 35.7 32.0 - 36.0 g/dL    RDW 11.9 11.5 - 14.5 %    Platelets 246 150 - 450 x10*3/uL    Neutrophils % 61.2 40.0 - 80.0 %    Immature Granulocytes %, Automated 0.2 0.0 - 0.9 %    Lymphocytes % 30.0 13.0 - 44.0 %    Monocytes % 8.0 2.0 - 10.0 %    Eosinophils % 0.3 0.0 - 6.0 %    Basophils % 0.3 0.0 - 2.0 %    Neutrophils Absolute 3.52 1.20 - 7.70 x10*3/uL    Immature Granulocytes Absolute, Automated 0.01 0.00 - 0.70 x10*3/uL    Lymphocytes Absolute 1.73 1.20 - 4.80 x10*3/uL    Monocytes Absolute 0.46 0.10 - 1.00  x10*3/uL    Eosinophils Absolute 0.02 0.00 - 0.70 x10*3/uL    Basophils Absolute 0.02 0.00 - 0.10 x10*3/uL   Comprehensive metabolic panel   Result Value Ref Range    Glucose 137 (H) 74 - 99 mg/dL    Sodium 132 (L) 136 - 145 mmol/L    Potassium 3.7 3.5 - 5.3 mmol/L    Chloride 97 (L) 98 - 107 mmol/L    Bicarbonate 25 21 - 32 mmol/L    Anion Gap 14 10 - 20 mmol/L    Urea Nitrogen 6 6 - 23 mg/dL    Creatinine 0.72 0.50 - 1.05 mg/dL    eGFR >90 >60 mL/min/1.73m*2    Calcium 8.9 8.6 - 10.3 mg/dL    Albumin 4.1 3.4 - 5.0 g/dL    Alkaline Phosphatase 49 33 - 136 U/L    Total Protein 6.7 6.4 - 8.2 g/dL    AST 25 9 - 39 U/L    Bilirubin, Total 1.1 0.0 - 1.2 mg/dL    ALT 27 7 - 45 U/L   Troponin I, High Sensitivity   Result Value Ref Range    Troponin I, High Sensitivity <3 0 - 13 ng/L   Protime-INR   Result Value Ref Range    Protime 12.4 9.8 - 12.4 seconds    INR 1.1 0.9 - 1.1   APTT   Result Value Ref Range    aPTT 24 (L) 26 - 36 seconds   Troponin I, High Sensitivity   Result Value Ref Range    Troponin I, High Sensitivity 4 0 - 13 ng/L        CT Head Imaging:  CTH imaging personally reviewed, showed no acute ischemic / hemorrhagic changes     CTA Head and Neck Imaging:  CTA head and neck imaging personally reviewed, no large vessel occlusion or severe stenosis seen    CT Perfusion Imaging:  N/a     Diagnosis:  Stroke not suspected, alternative etiology likely     Assessment/Plan:  64yoF with multiple vascular risk factors presents after LOC event during stress test. Suspect hypoperfusion to brain phenomenon -- however given concern for transient one sided weakness, would recommend MRI brain.     IV Thrombolysis IV Thrombolysis Checklist        IV Thrombolysis Given: No; Thrombolysis contraindication reason: Neurologic signs have spontaneously resolved           Patient is a candidate for thrombectomy:  yes/no: No; contraindication reason: No evidence of proximal occlusion and NIHSS <6    Additional Recommendations:  MRI  brain for transient L sided weakness in ED was recommended -- which was subsequently done and reviewed, it was negative for stroke.     Syncope / cardiac eval per ED     Disposition:  Patient will remain at referring facility for further evaluation and management.    Virtual or Telephone Consent    An interactive audio and video telecommunication system which permits real time communications between the patient (at the originating site) and provider (at the distant site) was utilized to provide this telehealth service.   Verbal consent was requested and obtained from Magda Jaimes on this date, 07/28/25 for a telehealth visit.      MDM was moderate complexity     Telestroke is covered in shift work. If there are further Neurological questions or concerns please contact your regional neurologist on call during daytime hours or contact the transfer center with an ADT20 order.    Shantanu Barreto MD

## 2025-07-28 NOTE — H&P
History and Physical Note  Patient: Magda Jaimes   Age: 64 y.o. Gender: female     History of Present Illness:   Admission Reason:   Chief Complaint   Patient presents with    Altered Mental Status     Pt is from [facility] where she was having a stress test. During her stress test she went unresponsive. Pt states her appointment was 0730, but she is unsure of a more clear last known well. When EMS arrived she states that she was able slowly answer questions but not completely. Per provider at bedside, pt has a left sided drift, stroke alert paged.      HPI:   Magda Jaimes is a 64 y.o. year old female with PMH HTN, HLD, preDM presented to the ED sp syncopal episode. She said she has been having SOB and multiple anti hypertensive med changes due to intolerance. She was sent to see cardio who ordered a stress test. She was on the treadmill when she had chest tightness. She was given nitro SL x2. She then had a syncopal episode. In the ED, she was slow to respond that there was concern for CVA that stroke alert was called. Tele neuro cleared pt. Hospitalist is then called for admission. She currently has some SOB but denies CP. Denies fever, chills, URI symptoms, abdominal pain, N/V/D.      Review of Systems:  Review of Systems   Constitutional:  Positive for activity change. Negative for appetite change, chills, fatigue and fever.   Respiratory:  Positive for shortness of breath.    Cardiovascular:  Positive for chest pain.   Gastrointestinal:  Negative for abdominal pain, nausea and vomiting.   Neurological:  Negative for headaches.   All other systems reviewed and are negative.      Allergies:   RX Allergies[1]    Past Medical History:  Medical History[2]    Past Surgical History:   Surgical History[3]    Family History:  Family History[4]    Social History:  Tobacco Use History[5]    Social History     Substance and Sexual Activity   Alcohol Use Never       Social History  "    Substance and Sexual Activity   Drug Use Never       Home Medications:  Current Outpatient Medications   Medication Instructions    aspirin 81 mg, Daily PRN    aspirin 325 mg, Daily PRN    lisinopril 30 mg, oral, Daily    rosuvastatin (CRESTOR) 10 mg, oral, Daily       Vitals:  Visit Vitals  /79   Pulse 69   Temp 36.8 °C (98.2 °F)   Resp 15   Ht 1.676 m (5' 6\")   Wt 82.6 kg (182 lb)   SpO2 97%   BMI 29.38 kg/m²   OB Status Menopausal   Smoking Status Former   BSA 1.96 m²       Physical Exam  Vitals and nursing note reviewed.   Constitutional:       General: She is not in acute distress.     Appearance: Normal appearance. She is not ill-appearing or toxic-appearing.   HENT:      Head: Normocephalic and atraumatic.      Nose: Nose normal.      Mouth/Throat:      Mouth: Mucous membranes are moist.     Eyes:      Extraocular Movements: Extraocular movements intact.      Conjunctiva/sclera: Conjunctivae normal.      Pupils: Pupils are equal, round, and reactive to light.       Cardiovascular:      Rate and Rhythm: Normal rate and regular rhythm.      Heart sounds: No murmur heard.     No gallop.   Pulmonary:      Effort: Pulmonary effort is normal. No respiratory distress.      Breath sounds: Normal breath sounds. No wheezing, rhonchi or rales.   Abdominal:      General: Abdomen is flat. Bowel sounds are normal. There is no distension.      Palpations: Abdomen is soft. There is no mass.      Tenderness: There is no abdominal tenderness.     Musculoskeletal:         General: No swelling or tenderness. Normal range of motion.      Cervical back: Normal range of motion and neck supple.     Skin:     General: Skin is warm and dry.     Neurological:      General: No focal deficit present.      Mental Status: She is alert and oriented to person, place, and time. Mental status is at baseline.     Psychiatric:         Mood and Affect: Mood normal.         Behavior: Behavior normal.         Thought Content: Thought " content normal.         Judgment: Judgment normal.         Labs and Imaging Results:  Lab Results   Component Value Date    WBC 5.8 07/28/2025       MR brain wo IV contrast  Narrative: Interpreted By:  Dewey Jones,   STUDY:  MR BRAIN WO IV CONTRAST;  7/28/2025 12:20 pm      INDICATION:  Signs/Symptoms:AMS, transient left side drift symptoms onset  immediately after medication administration from cardiac stress test.          COMPARISON:  07/28/2025 brain CT at 9:29 a.m.      ACCESSION NUMBER(S):  [id number]      ORDERING CLINICIAN:  TRUE DUKE      TECHNIQUE:  Axial T2, FLAIR, DWI, gradient echo T2 and sagittal and coronal T1  weighted images of brain were acquired.      FINDINGS:  CSF Spaces: The ventricles and sulci are normal, unchanged.      Parenchyma: The white matter has scattered nonspecific focal FLAIR  hyperintensities mostly in the subcortical region with a few foci  also noted centrally within the dustin.      No acute infarct, hemorrhage or mass is noted.      Incidental note is made of a 6 mm benign Tornwaldt cyst in the  midline nasopharynx.      Paranasal Sinuses and Mastoids: Visualized paranasal sinuses and  mastoid air cells are unremarkable.      Impression: No acute infarct, hemorrhage or mass is noted.      The parenchymal hyperintensities are nonspecific and may be secondary  to chronic microvascular ischemic, degenerative, demyelinating or  other etiologies.      MACRO:  None      Signed by: Dewey Jones 7/28/2025 12:23 PM  Dictation workstation:   [id number]  CT brain attack angio head and neck W and WO IV contrast  Narrative: Interpreted By:  Joo England,   STUDY:  CT BRAIN ATTACK ANGIO HEAD AND NECK W AND WO IV CONTRAST;  7/28/2025  9:33 am      INDICATION:  Signs/Symptoms:left upper and lower drift, symptom onset after stress  test, concern for stroke v dissection.          COMPARISON:  None.      ACCESSION NUMBER(S):  [id number]      ORDERING CLINICIAN:  TRUE DUKE       TECHNIQUE:  Unenhanced CT images of the head were obtained. Subsequently, 75 ML  of Omnipaque 350 was administered intravenously and axial images of  the head and neck were acquired.  Coronal, sagittal, and 3-D  reconstructions were provided for review.      FINDINGS:          CTA HEAD FINDINGS:      Anterior circulation: No evidence of aneurysm. No significant  stenosis or occlusion of the bilateral intracranial internal carotid  arteries, bilateral carotid terminals, or bilateral proximal anterior  and middle cerebral arteries.  Calcific atherosclerosis of the  carotid siphons.      Posterior circulation: No evidence of aneurysm. No significant  stenosis or occlusion of the intracranial vertebral arteries,  vertebrobasilar junction, basilar artery and proximal posterior  cerebral arteries.      CTA NECK FINDINGS:      Aortic arch: No significant stenosis of the origins of the major arch  vessels.      Left carotid system: Calcific atherosclerosis at the carotid bulb. No  evidence of significant (50% or greater) stenosis or occlusion.      Right carotid system: Calcific atherosclerosis at the carotid bulb.  No evidence of significant (50% or greater) stenosis or occlusion.      Vertebral arteries: Codominant. No evidence of significant (50% or  greater) stenosis or occlusion.          Additional: Low-attenuation lesion within the right thyroid lobe  measuring 8 mm (series 406, image 88) incompletely characterized.          Impression: No evidence for significant stenosis of the cervical vessels.      No evidence for significant stenosis or large branch vessel cutoffs  of the intracranial vessels.          Findings communicated with Suni Crowe MD by myself via secure  epic chat at 0949 hours on 07/28/2025      MACRO:  None      Signed by: Joo England 7/28/2025 9:49 AM  Dictation workstation:   [id number]  CT brain attack head wo IV contrast  Narrative: Interpreted By:  Joo England,   STUDY:  CT  BRAIN ATTACK HEAD WO IV CONTRAST;  7/28/2025 9:28 am      INDICATION:  Signs/Symptoms:Stroke Evaluation.          COMPARISON:  05/02/2025      ACCESSION NUMBER(S):  [id number]      ORDERING CLINICIAN:  SUNI CROWE      TECHNIQUE:  Noncontrast axial CT scan of head was performed. Angled reformats in  brain and bone windows were generated. The images were reviewed in  bone, brain, blood and soft tissue windows.      FINDINGS:  Calvarium/Skull base: Calvarium is intact.      Paranasal sinuses and mastoids: Visualized paranasal sinuses are  clear. Mastoid air cells are clear.      Parenchyma/CSF Spaces: Grey-white differentiation is maintained. No  mass effect or midline shift. No acute intracranial hemorrhage. No  hydrocephalus. Patchy low-attenuation within the periventricular and  subcortical white matter commonly attributed to chronic microvascular  ischemia in patients of this age. Global cerebral and cerebellar  volume loss with ex vacuo ventricular dilatation is age appropriate.  Intracranial atherosclerosis.      Impression: No CT evidence of acute intracranial abnormality.      Chronic senescent changes including white matter disease commonly  attributed to chronic microvascular ischemia and age-appropriate  volume loss.      Findings communicated with the Suni Crowe M.D. by myself via  secure epic chat at 0938 hours on 07/28/2025      MACRO:  None          Signed by: Joo England 7/28/2025 9:38 AM  Dictation workstation:   [id number]      Assessment and Plan:    Assessment & Plan  Syncope and collapse    Syncope  Could be cardiac related or BP related  - obs to tele  - check orthostats  - cardio consult    HTN  - BP low  - hold bp meds    HLD  - statin  - ASA    Diet: regular  DVT ppx: lovenox  Code status: Full, confirmed with pt  Dispo: admit as an hourly observation patient anticipating less than forty-eight hours or two midnights stay.       Alyssa Xiong MD  Hospitalist              [1]    Allergies  Allergen Reactions    Seasonale (91) [Levonorgestrel-Ethinyl Estrad] Other    Amlodipine Chills     Chest pressure, chills   [2]   Past Medical History:  Diagnosis Date    Allergy status to unspecified drugs, medicaments and biological substances     Multiple allergies    Pure hypercholesterolemia, unspecified     High cholesterol   [3]   Past Surgical History:  Procedure Laterality Date    HERNIA REPAIR  12/05/2016    Hernia Repair    HYSTERECTOMY  12/05/2016    Hysterectomy    OTHER SURGICAL HISTORY  12/05/2016    Breast Surgery Enlargement Procedure Bilateral    OTHER SURGICAL HISTORY  11/14/2018    Bladder surgery   [4] No family history on file.  [5]   Social History  Tobacco Use   Smoking Status Former    Types: Cigarettes   Smokeless Tobacco Never

## 2025-07-28 NOTE — LETTER
July 30, 2025     Patient: Magda Jaimes   YOB: 1960   Date of Visit: 7/28/2025- present       To Whom It May Concern:    Magda Jaimes was admitted on 7/28/2025. Her son Mal Castillo accompanied her to a procedure on 7/30/25. Please excuse his absence on 7/30/25.          Sincerely,       Beth Cooper CNP

## 2025-07-28 NOTE — Clinical Note
Closure device placed in the right femoral artery. Site closed by Myalex. Deployed By: Horace Jean RT

## 2025-07-29 ENCOUNTER — APPOINTMENT (OUTPATIENT)
Dept: CARDIOLOGY | Facility: HOSPITAL | Age: 65
End: 2025-07-29
Payer: COMMERCIAL

## 2025-07-29 ENCOUNTER — APPOINTMENT (OUTPATIENT)
Dept: VASCULAR MEDICINE | Facility: HOSPITAL | Age: 65
End: 2025-07-29
Payer: COMMERCIAL

## 2025-07-29 PROBLEM — R07.9 CHEST PAIN: Status: ACTIVE | Noted: 2025-07-28

## 2025-07-29 LAB
ALBUMIN SERPL BCP-MCNC: 3.9 G/DL (ref 3.4–5)
ALP SERPL-CCNC: 54 U/L (ref 33–136)
ALT SERPL W P-5'-P-CCNC: 23 U/L (ref 7–45)
ANION GAP SERPL CALC-SCNC: 10 MMOL/L (ref 10–20)
AORTIC VALVE PEAK VELOCITY: 1.88 M/S
AST SERPL W P-5'-P-CCNC: 19 U/L (ref 9–39)
ATRIAL RATE: 74 BPM
AV PEAK GRADIENT: 14 MMHG
BASOPHILS # BLD AUTO: 0.03 X10*3/UL (ref 0–0.1)
BASOPHILS NFR BLD AUTO: 0.4 %
BILIRUB SERPL-MCNC: 0.7 MG/DL (ref 0–1.2)
BUN SERPL-MCNC: 11 MG/DL (ref 6–23)
CALCIUM SERPL-MCNC: 9.1 MG/DL (ref 8.6–10.3)
CHLORIDE SERPL-SCNC: 99 MMOL/L (ref 98–107)
CO2 SERPL-SCNC: 29 MMOL/L (ref 21–32)
CREAT SERPL-MCNC: 0.64 MG/DL (ref 0.5–1.05)
D DIMER PPP FEU-MCNC: 430 NG/ML FEU
EGFRCR SERPLBLD CKD-EPI 2021: >90 ML/MIN/1.73M*2
EJECTION FRACTION APICAL 4 CHAMBER: 67
EJECTION FRACTION: 65 %
EOSINOPHIL # BLD AUTO: 0.07 X10*3/UL (ref 0–0.7)
EOSINOPHIL NFR BLD AUTO: 1 %
ERYTHROCYTE [DISTWIDTH] IN BLOOD BY AUTOMATED COUNT: 12 % (ref 11.5–14.5)
GLUCOSE SERPL-MCNC: 94 MG/DL (ref 74–99)
HCT VFR BLD AUTO: 40.7 % (ref 36–46)
HGB BLD-MCNC: 13.9 G/DL (ref 12–16)
IMM GRANULOCYTES # BLD AUTO: 0.02 X10*3/UL (ref 0–0.7)
IMM GRANULOCYTES NFR BLD AUTO: 0.3 % (ref 0–0.9)
LEFT ATRIUM VOLUME AREA LENGTH INDEX BSA: 21.8 ML/M2
LEFT VENTRICLE INTERNAL DIMENSION DIASTOLE: 3.99 CM (ref 3.5–6)
LYMPHOCYTES # BLD AUTO: 2.49 X10*3/UL (ref 1.2–4.8)
LYMPHOCYTES NFR BLD AUTO: 35.5 %
MCH RBC QN AUTO: 31.2 PG (ref 26–34)
MCHC RBC AUTO-ENTMCNC: 34.2 G/DL (ref 32–36)
MCV RBC AUTO: 92 FL (ref 80–100)
MITRAL VALVE E/A RATIO: 0.8
MONOCYTES # BLD AUTO: 0.68 X10*3/UL (ref 0.1–1)
MONOCYTES NFR BLD AUTO: 9.7 %
NEUTROPHILS # BLD AUTO: 3.72 X10*3/UL (ref 1.2–7.7)
NEUTROPHILS NFR BLD AUTO: 53.1 %
NRBC BLD-RTO: 0 /100 WBCS (ref 0–0)
P AXIS: 62 DEGREES
P OFFSET: 184 MS
P ONSET: 125 MS
PLATELET # BLD AUTO: 242 X10*3/UL (ref 150–450)
POTASSIUM SERPL-SCNC: 3.8 MMOL/L (ref 3.5–5.3)
PR INTERVAL: 196 MS
PROT SERPL-MCNC: 6.5 G/DL (ref 6.4–8.2)
Q ONSET: 223 MS
QRS COUNT: 12 BEATS
QRS DURATION: 86 MS
QT INTERVAL: 414 MS
QTC CALCULATION(BAZETT): 459 MS
QTC FREDERICIA: 444 MS
R AXIS: -3 DEGREES
RBC # BLD AUTO: 4.45 X10*6/UL (ref 4–5.2)
SODIUM SERPL-SCNC: 134 MMOL/L (ref 136–145)
T AXIS: 35 DEGREES
T OFFSET: 430 MS
VENTRICULAR RATE: 74 BPM
WBC # BLD AUTO: 7 X10*3/UL (ref 4.4–11.3)

## 2025-07-29 PROCEDURE — 36415 COLL VENOUS BLD VENIPUNCTURE: CPT | Performed by: NURSE PRACTITIONER

## 2025-07-29 PROCEDURE — 94760 N-INVAS EAR/PLS OXIMETRY 1: CPT

## 2025-07-29 PROCEDURE — 2500000001 HC RX 250 WO HCPCS SELF ADMINISTERED DRUGS (ALT 637 FOR MEDICARE OP): Performed by: STUDENT IN AN ORGANIZED HEALTH CARE EDUCATION/TRAINING PROGRAM

## 2025-07-29 PROCEDURE — 85379 FIBRIN DEGRADATION QUANT: CPT | Performed by: NURSE PRACTITIONER

## 2025-07-29 PROCEDURE — 85025 COMPLETE CBC W/AUTO DIFF WBC: CPT | Performed by: STUDENT IN AN ORGANIZED HEALTH CARE EDUCATION/TRAINING PROGRAM

## 2025-07-29 PROCEDURE — 93306 TTE W/DOPPLER COMPLETE: CPT

## 2025-07-29 PROCEDURE — 2500000002 HC RX 250 W HCPCS SELF ADMINISTERED DRUGS (ALT 637 FOR MEDICARE OP, ALT 636 FOR OP/ED): Performed by: STUDENT IN AN ORGANIZED HEALTH CARE EDUCATION/TRAINING PROGRAM

## 2025-07-29 PROCEDURE — 2500000004 HC RX 250 GENERAL PHARMACY W/ HCPCS (ALT 636 FOR OP/ED): Performed by: NURSE PRACTITIONER

## 2025-07-29 PROCEDURE — 96374 THER/PROPH/DIAG INJ IV PUSH: CPT | Mod: 59

## 2025-07-29 PROCEDURE — G0378 HOSPITAL OBSERVATION PER HR: HCPCS

## 2025-07-29 PROCEDURE — 93970 EXTREMITY STUDY: CPT

## 2025-07-29 PROCEDURE — 93970 EXTREMITY STUDY: CPT | Performed by: SURGERY

## 2025-07-29 PROCEDURE — 36415 COLL VENOUS BLD VENIPUNCTURE: CPT | Performed by: STUDENT IN AN ORGANIZED HEALTH CARE EDUCATION/TRAINING PROGRAM

## 2025-07-29 PROCEDURE — 99233 SBSQ HOSP IP/OBS HIGH 50: CPT | Performed by: NURSE PRACTITIONER

## 2025-07-29 PROCEDURE — 93306 TTE W/DOPPLER COMPLETE: CPT | Performed by: STUDENT IN AN ORGANIZED HEALTH CARE EDUCATION/TRAINING PROGRAM

## 2025-07-29 PROCEDURE — 84075 ASSAY ALKALINE PHOSPHATASE: CPT | Performed by: STUDENT IN AN ORGANIZED HEALTH CARE EDUCATION/TRAINING PROGRAM

## 2025-07-29 RX ORDER — ONDANSETRON HYDROCHLORIDE 2 MG/ML
4 INJECTION, SOLUTION INTRAVENOUS EVERY 6 HOURS PRN
Status: DISCONTINUED | OUTPATIENT
Start: 2025-07-29 | End: 2025-07-31 | Stop reason: HOSPADM

## 2025-07-29 RX ADMIN — ASPIRIN 81 MG: 81 TABLET, DELAYED RELEASE ORAL at 08:17

## 2025-07-29 RX ADMIN — ONDANSETRON 4 MG: 2 INJECTION, SOLUTION INTRAMUSCULAR; INTRAVENOUS at 10:35

## 2025-07-29 RX ADMIN — ROSUVASTATIN CALCIUM 10 MG: 10 TABLET, FILM COATED ORAL at 08:17

## 2025-07-29 ASSESSMENT — COGNITIVE AND FUNCTIONAL STATUS - GENERAL
DAILY ACTIVITIY SCORE: 24
MOBILITY SCORE: 24

## 2025-07-29 ASSESSMENT — ACTIVITIES OF DAILY LIVING (ADL): LACK_OF_TRANSPORTATION: NO

## 2025-07-29 ASSESSMENT — PAIN - FUNCTIONAL ASSESSMENT
PAIN_FUNCTIONAL_ASSESSMENT: 0-10
PAIN_FUNCTIONAL_ASSESSMENT: 0-10

## 2025-07-29 ASSESSMENT — ENCOUNTER SYMPTOMS
FEVER: 0
DIZZINESS: 0
CHILLS: 0
WEAKNESS: 0
COUGH: 0
SHORTNESS OF BREATH: 0
PALPITATIONS: 0

## 2025-07-29 ASSESSMENT — PAIN SCALES - GENERAL
PAINLEVEL_OUTOF10: 0 - NO PAIN
PAINLEVEL_OUTOF10: 0 - NO PAIN

## 2025-07-29 NOTE — H&P (VIEW-ONLY)
Inpatient consult to Cardiology  Consult performed by: ADAM Posey-CNP  Consult ordered by: Alyssa Xiong MD  Reason for consult: syncope, chest pain          History Of Present Illness  Magda Jaimes is a 64 y.o. female presenting with syncope. She was at Dr. River's office yesterday having a stress test. She was on the treadmill for about 3 minutes and started having midsternal chest pain. She sat in the chair and was given a Nitro SL. Due to not reaching target HR, she was given Lexiscan to complete the stress test and then passed out. There was concern for a seizure. Her BP at the time was stable but then EMS arrived and shortly after, her BP was 83/49. EKG tracings during this time did not show any acute changes.     Lab work in the ER showed glucose 137, sodium 132, potassium 3.7, BUN/creatinine 6/0.72, troponin negative, INR 1.1, WBCs 5.8, H&H 13.9/38.9, CT of the head negative, MRI of the brain negative for acute infarct.    EKG showed normal sinus rhythm, no acute ischemic changes.       Past Medical History  Medical History[1]    Surgical History  Surgical History[2]     Social History  She reports that she has quit smoking. Her smoking use included cigarettes. She has never used smokeless tobacco. She reports that she does not drink alcohol and does not use drugs.    Family History  Family History[3]     Allergies  Bee venom protein (honey bee), Pollen extracts, Seasonale (91) [levonorgestrel-ethinyl estrad], and Amlodipine    Review of Systems  Review of Systems   Constitutional:  Negative for chills and fever.   Respiratory:  Negative for cough and shortness of breath.    Cardiovascular:  Positive for chest pain. Negative for palpitations and leg swelling.   Musculoskeletal:  Negative for gait problem.   Neurological:  Positive for syncope. Negative for dizziness and weakness.   All other systems reviewed and are negative.         Physical Exam  Constitutional: Well developed,  "awake/alert/oriented x3, no distress, alert and cooperative  Eyes: PERRL, EOMI, clear sclera  ENMT: mucous membranes moist, no apparent injury, no lesions seen  Head/Neck: Neck supple, no apparent injury, thyroid without mass or tenderness, No JVD, trachea midline, no bruits  Respiratory/Thorax: Patent airways, CTAB, normal breath sounds with good chest expansion, thorax symmetric  Cardiovascular: Regular, rate and rhythm, no murmurs, 2+ equal pulses of the extremities, normal S 1and S 2  Gastrointestinal: Nondistended, soft, non-tender, no rebound tenderness or guarding, no masses palpable, no organomegaly, +BS, no bruits  Musculoskeletal: ROM intact, no joint swelling, normal strength  Extremities: normal extremities, no cyanosis edema, contusions or wounds, no clubbing  Neurological: alert and oriented x3, intact senses, motor, response and reflexes, normal strength  Lymphatic: No significant lymphadenopathy  Psychological: Appropriate mood and behavior  Skin: Warm and dry, no lesions, no rashes       Last Recorded Vitals  Blood pressure 138/82, pulse 75, temperature 36.9 °C (98.4 °F), temperature source Temporal, resp. rate 18, height 1.676 m (5' 6\"), weight 82.6 kg (182 lb), SpO2 95%.    Medications  Scheduled medications  Scheduled Medications[4]  Continuous medications  Continuous Medications[5]  PRN medications  PRN Medications[6]    Relevant Results  Results for orders placed or performed during the hospital encounter of 07/28/25 (from the past 24 hours)   Comprehensive Metabolic Panel   Result Value Ref Range    Glucose 94 74 - 99 mg/dL    Sodium 134 (L) 136 - 145 mmol/L    Potassium 3.8 3.5 - 5.3 mmol/L    Chloride 99 98 - 107 mmol/L    Bicarbonate 29 21 - 32 mmol/L    Anion Gap 10 10 - 20 mmol/L    Urea Nitrogen 11 6 - 23 mg/dL    Creatinine 0.64 0.50 - 1.05 mg/dL    eGFR >90 >60 mL/min/1.73m*2    Calcium 9.1 8.6 - 10.3 mg/dL    Albumin 3.9 3.4 - 5.0 g/dL    Alkaline Phosphatase 54 33 - 136 U/L    Total " Protein 6.5 6.4 - 8.2 g/dL    AST 19 9 - 39 U/L    Bilirubin, Total 0.7 0.0 - 1.2 mg/dL    ALT 23 7 - 45 U/L   CBC and Auto Differential   Result Value Ref Range    WBC 7.0 4.4 - 11.3 x10*3/uL    nRBC 0.0 0.0 - 0.0 /100 WBCs    RBC 4.45 4.00 - 5.20 x10*6/uL    Hemoglobin 13.9 12.0 - 16.0 g/dL    Hematocrit 40.7 36.0 - 46.0 %    MCV 92 80 - 100 fL    MCH 31.2 26.0 - 34.0 pg    MCHC 34.2 32.0 - 36.0 g/dL    RDW 12.0 11.5 - 14.5 %    Platelets 242 150 - 450 x10*3/uL    Neutrophils % 53.1 40.0 - 80.0 %    Immature Granulocytes %, Automated 0.3 0.0 - 0.9 %    Lymphocytes % 35.5 13.0 - 44.0 %    Monocytes % 9.7 2.0 - 10.0 %    Eosinophils % 1.0 0.0 - 6.0 %    Basophils % 0.4 0.0 - 2.0 %    Neutrophils Absolute 3.72 1.20 - 7.70 x10*3/uL    Immature Granulocytes Absolute, Automated 0.02 0.00 - 0.70 x10*3/uL    Lymphocytes Absolute 2.49 1.20 - 4.80 x10*3/uL    Monocytes Absolute 0.68 0.10 - 1.00 x10*3/uL    Eosinophils Absolute 0.07 0.00 - 0.70 x10*3/uL    Basophils Absolute 0.03 0.00 - 0.10 x10*3/uL   D-dimer, VTE Exclusion   Result Value Ref Range    D-Dimer, Quantitative VTE Exclusion 430 <=500 ng/mL FEU   Lower extremity venous duplex bilateral   Result Value Ref Range    BSA 1.96 m2       Lower extremity venous duplex bilateral         MR brain wo IV contrast   Final Result   No acute infarct, hemorrhage or mass is noted.        The parenchymal hyperintensities are nonspecific and may be secondary   to chronic microvascular ischemic, degenerative, demyelinating or   other etiologies.        MACRO:   None        Signed by: Dewey Jones 7/28/2025 12:23 PM   Dictation workstation:   [id number]      CT brain attack angio head and neck W and WO IV contrast   Final Result   No evidence for significant stenosis of the cervical vessels.        No evidence for significant stenosis or large branch vessel cutoffs   of the intracranial vessels.             Findings communicated with Suni Crowe MD by myself via secure   epic  chat at 0949 hours on 07/28/2025        MACRO:   None        Signed by: Joo England 7/28/2025 9:49 AM   Dictation workstation:   [id number]      CT brain attack head wo IV contrast   Final Result   No CT evidence of acute intracranial abnormality.        Chronic senescent changes including white matter disease commonly   attributed to chronic microvascular ischemia and age-appropriate   volume loss.        Findings communicated with the Suni Crowe M.D. by myself via   secure epic chat at 0938 hours on 07/28/2025        MACRO:   None             Signed by: Joo England 7/28/2025 9:38 AM   Dictation workstation:   [id number]      Transthoracic Echo Complete    (Results Pending)   Cardiac Catheterization Procedure    (Results Pending)       No echocardiogram results found for the past 12 months       Assessment/Plan   Chest pain  -During stress test with syncope  -Troponins negative  -CT cardiac score last week 370  -I reviewed the EKG, no acute changes, ST elevation, or depression  -Check echo  -Discussed with Dr. River, advise [facility] tomorrow  -Cont asa and statin    2. Hypertension with hypotension  -BP low after syncopal event, now stable  -Hold lisinopril  -2gm na diet  -monitor    3. Hyperlipidemia  -Cont statin        Ashley Reza, APRN-CNP         [1]   Past Medical History:  Diagnosis Date    Allergy status to unspecified drugs, medicaments and biological substances     Multiple allergies    Pure hypercholesterolemia, unspecified     High cholesterol   [2]   Past Surgical History:  Procedure Laterality Date    HERNIA REPAIR  12/05/2016    Hernia Repair    HYSTERECTOMY  12/05/2016    Hysterectomy    OTHER SURGICAL HISTORY  12/05/2016    Breast Surgery Enlargement Procedure Bilateral    OTHER SURGICAL HISTORY  11/14/2018    Bladder surgery   [3] No family history on file.  [4] aspirin, 81 mg, oral, Daily  rosuvastatin, 10 mg, oral, Daily  [5]    [6] PRN medications: ondansetron

## 2025-07-29 NOTE — SIGNIFICANT EVENT
07/29/25 1033   Discharge Planning   Assistance Needed Recieved consult stating that pt did not have insurance. SW met with pt at bedside who stated that she retired on July 7th and she was able to reinstate her insurance until August 1st when she will have Medicare. Pt denies any further discharge needs at this time.

## 2025-07-29 NOTE — PROGRESS NOTES
Magda Jaimes is a 64 y.o. female on day 0 of admission presenting with Syncope and collapse.      Subjective   The patient complains of nausea this morning.  Denies current chest pain.       Objective     Last Recorded Vitals  /82 (BP Location: Left arm, Patient Position: Sitting)   Pulse 75   Temp 36.9 °C (98.4 °F) (Temporal)   Resp 18   Wt 82.6 kg (182 lb)   SpO2 95%   Intake/Output last 3 Shifts:    Intake/Output Summary (Last 24 hours) at 7/29/2025 1437  Last data filed at 7/29/2025 1004  Gross per 24 hour   Intake 360 ml   Output --   Net 360 ml       Admission Weight  Weight: 82.6 kg (182 lb) (07/28/25 0942)    Daily Weight  07/28/25 : 82.6 kg (182 lb)    Image Results  Lower extremity venous duplex bilateral  Preliminary Cardiology Report            [place] [address]   Tel [phone] and Fax [phone]               Preliminary Vascular Lab Report     [facility] US LOWER EXTREMITY VENOUS DUPLEX BILATERAL       Patient Name:      MAGDA Elliott Physician: [id number] BJORN Griggs MD                               RPVI  Study Date:        7/29/2025        Ordering           [id number] CAROL AMATO                                      Physician:  MRN/PID:           [id number]         Technologist:      Gabriela Neff RVSHELDON  Accession#:        [id number]     Technologist 2:  Date of Birth/Age: 1960        Encounter#:        [id number]  Gender:            F  Admission Status:  Inpatient        Location           [facility]                                      Performed:       Diagnosis/ICD: Localized (leg) edema-R60.0  Procedure/CPT: 44993 Peripheral venous duplex scan for DVT complete       PRELIMINARY CONCLUSIONS:  Right Lower Venous: No evidence of acute deep vein thrombus visualized in the right lower extremity.  Left Lower Venous: No evidence of acute deep vein thrombus visualized in the left lower extremity.      Imaging & Doppler Findings:     Right                 Compressible Thrombus        Flow  Distal External Iliac                       Spontaneous/Phasic  CFV                       Yes        None   Spontaneous/Phasic  PFV                       Yes        None  FV Proximal               Yes        None   Spontaneous/Phasic  FV Mid                    Yes        None  FV Distal                 Yes        None  Popliteal                 Yes        None   Spontaneous/Phasic  Peroneal                  Yes        None  PTV                       Yes        None       Left                  Compress Thrombus        Flow  Distal External Iliac            None   Spontaneous/Phasic  CFV                     Yes      None   Spontaneous/Phasic  PFV                     Yes      None  FV Proximal             Yes      None   Spontaneous/Phasic  FV Mid                  Yes      None  FV Distal               Yes      None  Popliteal               Yes      None   Spontaneous/Phasic  Peroneal                Yes      None  PTV                     Yes      None    VASCULAR PRELIMINARY REPORT  completed by Gabriela Neff RVT on 7/29/2025 at 1:01:15 PM       ** Final **  ECG 12 lead  Normal sinus rhythm  Normal ECG  When compared with ECG of 02-MAY-2025 15:37,  No significant change was found      Physical Exam    Relevant Results           Results for orders placed or performed during the hospital encounter of 07/28/25 (from the past 24 hours)   Comprehensive Metabolic Panel   Result Value Ref Range    Glucose 94 74 - 99 mg/dL    Sodium 134 (L) 136 - 145 mmol/L    Potassium 3.8 3.5 - 5.3 mmol/L    Chloride 99 98 - 107 mmol/L    Bicarbonate 29 21 - 32 mmol/L    Anion Gap 10 10 - 20 mmol/L    Urea Nitrogen 11 6 - 23 mg/dL    Creatinine 0.64 0.50 - 1.05 mg/dL    eGFR >90 >60 mL/min/1.73m*2    Calcium 9.1 8.6 - 10.3 mg/dL    Albumin 3.9 3.4 - 5.0 g/dL    Alkaline Phosphatase 54 33 - 136 U/L    Total Protein 6.5 6.4 - 8.2 g/dL    AST 19 9 - 39  U/L    Bilirubin, Total 0.7 0.0 - 1.2 mg/dL    ALT 23 7 - 45 U/L   CBC and Auto Differential   Result Value Ref Range    WBC 7.0 4.4 - 11.3 x10*3/uL    nRBC 0.0 0.0 - 0.0 /100 WBCs    RBC 4.45 4.00 - 5.20 x10*6/uL    Hemoglobin 13.9 12.0 - 16.0 g/dL    Hematocrit 40.7 36.0 - 46.0 %    MCV 92 80 - 100 fL    MCH 31.2 26.0 - 34.0 pg    MCHC 34.2 32.0 - 36.0 g/dL    RDW 12.0 11.5 - 14.5 %    Platelets 242 150 - 450 x10*3/uL    Neutrophils % 53.1 40.0 - 80.0 %    Immature Granulocytes %, Automated 0.3 0.0 - 0.9 %    Lymphocytes % 35.5 13.0 - 44.0 %    Monocytes % 9.7 2.0 - 10.0 %    Eosinophils % 1.0 0.0 - 6.0 %    Basophils % 0.4 0.0 - 2.0 %    Neutrophils Absolute 3.72 1.20 - 7.70 x10*3/uL    Immature Granulocytes Absolute, Automated 0.02 0.00 - 0.70 x10*3/uL    Lymphocytes Absolute 2.49 1.20 - 4.80 x10*3/uL    Monocytes Absolute 0.68 0.10 - 1.00 x10*3/uL    Eosinophils Absolute 0.07 0.00 - 0.70 x10*3/uL    Basophils Absolute 0.03 0.00 - 0.10 x10*3/uL   D-dimer, VTE Exclusion   Result Value Ref Range    D-Dimer, Quantitative VTE Exclusion 430 <=500 ng/mL FEU   Lower extremity venous duplex bilateral   Result Value Ref Range    BSA 1.96 m2   Transthoracic Echo Complete   Result Value Ref Range    BSA 1.96 m2                      Assessment & Plan    Magda Jaimes is a 64 y.o. year old female with PMH HTN, HLD, preDM presented to the ED sp syncopal episode.    Syncope and collapse  -This could be related to hypotension, since this occurred after receiving 2 nitro glycerin sublingual tabs.  -CT head shows no acute infarct, hemorrhage or mass  -Orthostatics normal  -Will continue telemetry monitoring  -D-dimer 430  -US BLE negative for DVT  -Echocardiogram pending  -Cardiology plans [facility] in the morning, 7/30/25    Chest pain  -Will continue aspirin  -Continue telemetry monitoring  -Treating nausea  -N.p.o. after midnight for [facility]    Incidental finding: Tornwaldt cyst  -Benign per MRI     Hypotension with history of  HTN  - BP low in route  - Will continue to hold antihypertensives    HLD  - statin  - ASA     Dispo: [facility] in the a.m.     ADAM Portillo-CNP

## 2025-07-29 NOTE — PROGRESS NOTES
07/29/25 1039   Discharge Planning   Living Arrangements Spouse/significant other   Support Systems Spouse/significant other   Assistance Needed Alert and oriented x 4, Independent with ADL's, Drives, No DME used, Room air at baseline and currently, No CPAP/Bipap, PCP-Heladio Castaneda PA-C   Type of Residence Private residence   Number of Stairs to Enter Residence 5   Number of Stairs Within Residence 24  (12 steps upstairs and 12 steps to basement)   Do you have animals or pets at home? No   Who is requesting discharge planning? Provider   Home or Post Acute Services None   Expected Discharge Disposition Home   Does the patient need discharge transport arranged? No   Financial Resource Strain   How hard is it for you to pay for the very basics like food, housing, medical care, and heating? Not hard   Housing Stability   In the last 12 months, was there a time when you were not able to pay the mortgage or rent on time? N   In the past 12 months, how many times have you moved where you were living? 0   At any time in the past 12 months, were you homeless or living in a shelter (including now)? N   Transportation Needs   In the past 12 months, has lack of transportation kept you from medical appointments or from getting medications? no   In the past 12 months, has lack of transportation kept you from meetings, work, or from getting things needed for daily living? No   Patient Choice   Provider Choice list and CMS website (https://medicare.gov/care-compare#search) for post-acute Quality and Resource Measure Data were provided and reviewed with: Patient   Patient / Family choosing to utilize agency / facility established prior to hospitalization No   Stroke Family Assessment   Stroke Family Assessment Needed No   Intensity of Service   Intensity of Service 0-30 min

## 2025-07-29 NOTE — CONSULTS
Inpatient consult to Cardiology  Consult performed by: DAAM Posey-CNP  Consult ordered by: Alyssa Xiong MD  Reason for consult: syncope, chest pain          History Of Present Illness  Magda Jaimes is a 64 y.o. female presenting with syncope. She was at Dr. River's office yesterday having a stress test. She was on the treadmill for about 3 minutes and started having midsternal chest pain. She sat in the chair and was given a Nitro SL. Due to not reaching target HR, she was given Lexiscan to complete the stress test and then passed out. There was concern for a seizure. Her BP at the time was stable but then EMS arrived and shortly after, her BP was 83/49. EKG tracings during this time did not show any acute changes.     Lab work in the ER showed glucose 137, sodium 132, potassium 3.7, BUN/creatinine 6/0.72, troponin negative, INR 1.1, WBCs 5.8, H&H 13.9/38.9, CT of the head negative, MRI of the brain negative for acute infarct.    EKG showed normal sinus rhythm, no acute ischemic changes.       Past Medical History  Medical History[1]    Surgical History  Surgical History[2]     Social History  She reports that she has quit smoking. Her smoking use included cigarettes. She has never used smokeless tobacco. She reports that she does not drink alcohol and does not use drugs.    Family History  Family History[3]     Allergies  Bee venom protein (honey bee), Pollen extracts, Seasonale (91) [levonorgestrel-ethinyl estrad], and Amlodipine    Review of Systems  Review of Systems   Constitutional:  Negative for chills and fever.   Respiratory:  Negative for cough and shortness of breath.    Cardiovascular:  Positive for chest pain. Negative for palpitations and leg swelling.   Musculoskeletal:  Negative for gait problem.   Neurological:  Positive for syncope. Negative for dizziness and weakness.   All other systems reviewed and are negative.         Physical Exam  Constitutional: Well developed,  "awake/alert/oriented x3, no distress, alert and cooperative  Eyes: PERRL, EOMI, clear sclera  ENMT: mucous membranes moist, no apparent injury, no lesions seen  Head/Neck: Neck supple, no apparent injury, thyroid without mass or tenderness, No JVD, trachea midline, no bruits  Respiratory/Thorax: Patent airways, CTAB, normal breath sounds with good chest expansion, thorax symmetric  Cardiovascular: Regular, rate and rhythm, no murmurs, 2+ equal pulses of the extremities, normal S 1and S 2  Gastrointestinal: Nondistended, soft, non-tender, no rebound tenderness or guarding, no masses palpable, no organomegaly, +BS, no bruits  Musculoskeletal: ROM intact, no joint swelling, normal strength  Extremities: normal extremities, no cyanosis edema, contusions or wounds, no clubbing  Neurological: alert and oriented x3, intact senses, motor, response and reflexes, normal strength  Lymphatic: No significant lymphadenopathy  Psychological: Appropriate mood and behavior  Skin: Warm and dry, no lesions, no rashes       Last Recorded Vitals  Blood pressure 138/82, pulse 75, temperature 36.9 °C (98.4 °F), temperature source Temporal, resp. rate 18, height 1.676 m (5' 6\"), weight 82.6 kg (182 lb), SpO2 95%.    Medications  Scheduled medications  Scheduled Medications[4]  Continuous medications  Continuous Medications[5]  PRN medications  PRN Medications[6]    Relevant Results  Results for orders placed or performed during the hospital encounter of 07/28/25 (from the past 24 hours)   Comprehensive Metabolic Panel   Result Value Ref Range    Glucose 94 74 - 99 mg/dL    Sodium 134 (L) 136 - 145 mmol/L    Potassium 3.8 3.5 - 5.3 mmol/L    Chloride 99 98 - 107 mmol/L    Bicarbonate 29 21 - 32 mmol/L    Anion Gap 10 10 - 20 mmol/L    Urea Nitrogen 11 6 - 23 mg/dL    Creatinine 0.64 0.50 - 1.05 mg/dL    eGFR >90 >60 mL/min/1.73m*2    Calcium 9.1 8.6 - 10.3 mg/dL    Albumin 3.9 3.4 - 5.0 g/dL    Alkaline Phosphatase 54 33 - 136 U/L    Total " Protein 6.5 6.4 - 8.2 g/dL    AST 19 9 - 39 U/L    Bilirubin, Total 0.7 0.0 - 1.2 mg/dL    ALT 23 7 - 45 U/L   CBC and Auto Differential   Result Value Ref Range    WBC 7.0 4.4 - 11.3 x10*3/uL    nRBC 0.0 0.0 - 0.0 /100 WBCs    RBC 4.45 4.00 - 5.20 x10*6/uL    Hemoglobin 13.9 12.0 - 16.0 g/dL    Hematocrit 40.7 36.0 - 46.0 %    MCV 92 80 - 100 fL    MCH 31.2 26.0 - 34.0 pg    MCHC 34.2 32.0 - 36.0 g/dL    RDW 12.0 11.5 - 14.5 %    Platelets 242 150 - 450 x10*3/uL    Neutrophils % 53.1 40.0 - 80.0 %    Immature Granulocytes %, Automated 0.3 0.0 - 0.9 %    Lymphocytes % 35.5 13.0 - 44.0 %    Monocytes % 9.7 2.0 - 10.0 %    Eosinophils % 1.0 0.0 - 6.0 %    Basophils % 0.4 0.0 - 2.0 %    Neutrophils Absolute 3.72 1.20 - 7.70 x10*3/uL    Immature Granulocytes Absolute, Automated 0.02 0.00 - 0.70 x10*3/uL    Lymphocytes Absolute 2.49 1.20 - 4.80 x10*3/uL    Monocytes Absolute 0.68 0.10 - 1.00 x10*3/uL    Eosinophils Absolute 0.07 0.00 - 0.70 x10*3/uL    Basophils Absolute 0.03 0.00 - 0.10 x10*3/uL   D-dimer, VTE Exclusion   Result Value Ref Range    D-Dimer, Quantitative VTE Exclusion 430 <=500 ng/mL FEU   Lower extremity venous duplex bilateral   Result Value Ref Range    BSA 1.96 m2       Lower extremity venous duplex bilateral         MR brain wo IV contrast   Final Result   No acute infarct, hemorrhage or mass is noted.        The parenchymal hyperintensities are nonspecific and may be secondary   to chronic microvascular ischemic, degenerative, demyelinating or   other etiologies.        MACRO:   None        Signed by: Dewey Jones 7/28/2025 12:23 PM   Dictation workstation:   [id number]      CT brain attack angio head and neck W and WO IV contrast   Final Result   No evidence for significant stenosis of the cervical vessels.        No evidence for significant stenosis or large branch vessel cutoffs   of the intracranial vessels.             Findings communicated with Suni Crowe MD by myself via secure   epic  chat at 0949 hours on 07/28/2025        MACRO:   None        Signed by: Joo England 7/28/2025 9:49 AM   Dictation workstation:   [id number]      CT brain attack head wo IV contrast   Final Result   No CT evidence of acute intracranial abnormality.        Chronic senescent changes including white matter disease commonly   attributed to chronic microvascular ischemia and age-appropriate   volume loss.        Findings communicated with the Suni Crowe M.D. by myself via   secure epic chat at 0938 hours on 07/28/2025        MACRO:   None             Signed by: Joo England 7/28/2025 9:38 AM   Dictation workstation:   [id number]      Transthoracic Echo Complete    (Results Pending)   Cardiac Catheterization Procedure    (Results Pending)       No echocardiogram results found for the past 12 months       Assessment/Plan   Chest pain  -During stress test with syncope  -Troponins negative  -CT cardiac score last week 370  -I reviewed the EKG, no acute changes, ST elevation, or depression  -Check echo  -Discussed with Dr. River, advise [facility] tomorrow  -Cont asa and statin    2. Hypertension with hypotension  -BP low after syncopal event, now stable  -Hold lisinopril  -2gm na diet  -monitor    3. Hyperlipidemia  -Cont statin        Ashley Reza, APRN-CNP         [1]   Past Medical History:  Diagnosis Date    Allergy status to unspecified drugs, medicaments and biological substances     Multiple allergies    Pure hypercholesterolemia, unspecified     High cholesterol   [2]   Past Surgical History:  Procedure Laterality Date    HERNIA REPAIR  12/05/2016    Hernia Repair    HYSTERECTOMY  12/05/2016    Hysterectomy    OTHER SURGICAL HISTORY  12/05/2016    Breast Surgery Enlargement Procedure Bilateral    OTHER SURGICAL HISTORY  11/14/2018    Bladder surgery   [3] No family history on file.  [4] aspirin, 81 mg, oral, Daily  rosuvastatin, 10 mg, oral, Daily  [5]    [6] PRN medications: ondansetron

## 2025-07-30 ENCOUNTER — SURGERY (OUTPATIENT)
Age: 65
End: 2025-07-30
Payer: COMMERCIAL

## 2025-07-30 LAB
ALBUMIN SERPL BCP-MCNC: 4 G/DL (ref 3.4–5)
ALP SERPL-CCNC: 59 U/L (ref 33–136)
ALT SERPL W P-5'-P-CCNC: 23 U/L (ref 7–45)
ANION GAP SERPL CALC-SCNC: 14 MMOL/L (ref 10–20)
AST SERPL W P-5'-P-CCNC: 20 U/L (ref 9–39)
BASOPHILS # BLD AUTO: 0.03 X10*3/UL (ref 0–0.1)
BASOPHILS NFR BLD AUTO: 0.5 %
BILIRUB SERPL-MCNC: 0.7 MG/DL (ref 0–1.2)
BUN SERPL-MCNC: 10 MG/DL (ref 6–23)
CALCIUM SERPL-MCNC: 9 MG/DL (ref 8.6–10.3)
CHLORIDE SERPL-SCNC: 100 MMOL/L (ref 98–107)
CHOLEST SERPL-MCNC: 135 MG/DL (ref 0–199)
CHOLESTEROL/HDL RATIO: 3.1
CO2 SERPL-SCNC: 25 MMOL/L (ref 21–32)
CREAT SERPL-MCNC: 0.63 MG/DL (ref 0.5–1.05)
EGFRCR SERPLBLD CKD-EPI 2021: >90 ML/MIN/1.73M*2
EOSINOPHIL # BLD AUTO: 0.07 X10*3/UL (ref 0–0.7)
EOSINOPHIL NFR BLD AUTO: 1.1 %
ERYTHROCYTE [DISTWIDTH] IN BLOOD BY AUTOMATED COUNT: 12.1 % (ref 11.5–14.5)
EST. AVERAGE GLUCOSE BLD GHB EST-MCNC: 120 MG/DL
GLUCOSE SERPL-MCNC: 99 MG/DL (ref 74–99)
HBA1C MFR BLD: 5.8 % (ref ?–5.7)
HCT VFR BLD AUTO: 40.3 % (ref 36–46)
HDLC SERPL-MCNC: 44.1 MG/DL
HGB BLD-MCNC: 13.9 G/DL (ref 12–16)
IMM GRANULOCYTES # BLD AUTO: 0.01 X10*3/UL (ref 0–0.7)
IMM GRANULOCYTES NFR BLD AUTO: 0.2 % (ref 0–0.9)
LDLC SERPL CALC-MCNC: 59 MG/DL
LYMPHOCYTES # BLD AUTO: 2.03 X10*3/UL (ref 1.2–4.8)
LYMPHOCYTES NFR BLD AUTO: 31.5 %
MCH RBC QN AUTO: 31.2 PG (ref 26–34)
MCHC RBC AUTO-ENTMCNC: 34.5 G/DL (ref 32–36)
MCV RBC AUTO: 91 FL (ref 80–100)
MONOCYTES # BLD AUTO: 0.55 X10*3/UL (ref 0.1–1)
MONOCYTES NFR BLD AUTO: 8.5 %
NEUTROPHILS # BLD AUTO: 3.75 X10*3/UL (ref 1.2–7.7)
NEUTROPHILS NFR BLD AUTO: 58.2 %
NON HDL CHOLESTEROL: 91 MG/DL (ref 0–149)
NRBC BLD-RTO: 0 /100 WBCS (ref 0–0)
PLATELET # BLD AUTO: 260 X10*3/UL (ref 150–450)
POTASSIUM SERPL-SCNC: 3.6 MMOL/L (ref 3.5–5.3)
PROT SERPL-MCNC: 6.9 G/DL (ref 6.4–8.2)
RBC # BLD AUTO: 4.45 X10*6/UL (ref 4–5.2)
SODIUM SERPL-SCNC: 135 MMOL/L (ref 136–145)
TRIGL SERPL-MCNC: 158 MG/DL (ref 0–149)
VLDL: 32 MG/DL (ref 0–40)
WBC # BLD AUTO: 6.4 X10*3/UL (ref 4.4–11.3)

## 2025-07-30 PROCEDURE — 2780000003 HC OR 278 NO HCPCS: Performed by: INTERNAL MEDICINE

## 2025-07-30 PROCEDURE — G0378 HOSPITAL OBSERVATION PER HR: HCPCS

## 2025-07-30 PROCEDURE — 85025 COMPLETE CBC W/AUTO DIFF WBC: CPT | Performed by: STUDENT IN AN ORGANIZED HEALTH CARE EDUCATION/TRAINING PROGRAM

## 2025-07-30 PROCEDURE — 80061 LIPID PANEL: CPT | Performed by: PHYSICIAN ASSISTANT

## 2025-07-30 PROCEDURE — 2500000002 HC RX 250 W HCPCS SELF ADMINISTERED DRUGS (ALT 637 FOR MEDICARE OP, ALT 636 FOR OP/ED): Performed by: STUDENT IN AN ORGANIZED HEALTH CARE EDUCATION/TRAINING PROGRAM

## 2025-07-30 PROCEDURE — 94760 N-INVAS EAR/PLS OXIMETRY 1: CPT

## 2025-07-30 PROCEDURE — G0269 OCCLUSIVE DEVICE IN VEIN ART: HCPCS | Performed by: INTERNAL MEDICINE

## 2025-07-30 PROCEDURE — A4217 STERILE WATER/SALINE, 500 ML: HCPCS | Performed by: INTERNAL MEDICINE

## 2025-07-30 PROCEDURE — 2500000004 HC RX 250 GENERAL PHARMACY W/ HCPCS (ALT 636 FOR OP/ED): Performed by: INTERNAL MEDICINE

## 2025-07-30 PROCEDURE — 7100000001 HC RECOVERY ROOM TIME - INITIAL BASE CHARGE: Performed by: INTERNAL MEDICINE

## 2025-07-30 PROCEDURE — 2720000007 HC OR 272 NO HCPCS: Performed by: INTERNAL MEDICINE

## 2025-07-30 PROCEDURE — C1760 CLOSURE DEV, VASC: HCPCS | Performed by: INTERNAL MEDICINE

## 2025-07-30 PROCEDURE — 36415 COLL VENOUS BLD VENIPUNCTURE: CPT | Performed by: STUDENT IN AN ORGANIZED HEALTH CARE EDUCATION/TRAINING PROGRAM

## 2025-07-30 PROCEDURE — 93454 CORONARY ARTERY ANGIO S&I: CPT | Performed by: INTERNAL MEDICINE

## 2025-07-30 PROCEDURE — C1894 INTRO/SHEATH, NON-LASER: HCPCS | Performed by: INTERNAL MEDICINE

## 2025-07-30 PROCEDURE — 2550000001 HC RX 255 CONTRASTS: Performed by: INTERNAL MEDICINE

## 2025-07-30 PROCEDURE — 2500000001 HC RX 250 WO HCPCS SELF ADMINISTERED DRUGS (ALT 637 FOR MEDICARE OP): Performed by: STUDENT IN AN ORGANIZED HEALTH CARE EDUCATION/TRAINING PROGRAM

## 2025-07-30 PROCEDURE — 99233 SBSQ HOSP IP/OBS HIGH 50: CPT | Performed by: NURSE PRACTITIONER

## 2025-07-30 PROCEDURE — 83036 HEMOGLOBIN GLYCOSYLATED A1C: CPT | Mod: GEALAB | Performed by: PHYSICIAN ASSISTANT

## 2025-07-30 PROCEDURE — 2500000005 HC RX 250 GENERAL PHARMACY W/O HCPCS: Performed by: INTERNAL MEDICINE

## 2025-07-30 PROCEDURE — 7100000002 HC RECOVERY ROOM TIME - EACH INCREMENTAL 1 MINUTE: Performed by: INTERNAL MEDICINE

## 2025-07-30 PROCEDURE — 80053 COMPREHEN METABOLIC PANEL: CPT | Performed by: STUDENT IN AN ORGANIZED HEALTH CARE EDUCATION/TRAINING PROGRAM

## 2025-07-30 RX ORDER — LIDOCAINE HYDROCHLORIDE 20 MG/ML
INJECTION, SOLUTION INFILTRATION; PERINEURAL AS NEEDED
Status: DISCONTINUED | OUTPATIENT
Start: 2025-07-30 | End: 2025-07-30 | Stop reason: HOSPADM

## 2025-07-30 RX ORDER — FENTANYL CITRATE 50 UG/ML
INJECTION, SOLUTION INTRAMUSCULAR; INTRAVENOUS AS NEEDED
Status: DISCONTINUED | OUTPATIENT
Start: 2025-07-30 | End: 2025-07-30 | Stop reason: HOSPADM

## 2025-07-30 RX ORDER — MIDAZOLAM HYDROCHLORIDE 1 MG/ML
INJECTION, SOLUTION INTRAMUSCULAR; INTRAVENOUS AS NEEDED
Status: DISCONTINUED | OUTPATIENT
Start: 2025-07-30 | End: 2025-07-30 | Stop reason: HOSPADM

## 2025-07-30 RX ORDER — SODIUM CHLORIDE 9 MG/ML
INJECTION, SOLUTION INTRAVENOUS CONTINUOUS PRN
Status: COMPLETED | OUTPATIENT
Start: 2025-07-30 | End: 2025-07-30

## 2025-07-30 RX ADMIN — MIDAZOLAM 1 MG: 1 INJECTION INTRAMUSCULAR; INTRAVENOUS at 15:15

## 2025-07-30 RX ADMIN — MIDAZOLAM 1 MG: 1 INJECTION INTRAMUSCULAR; INTRAVENOUS at 15:17

## 2025-07-30 RX ADMIN — LIDOCAINE HYDROCHLORIDE 10 ML: 20 INJECTION, SOLUTION INFILTRATION; PERINEURAL at 15:17

## 2025-07-30 RX ADMIN — IOHEXOL 40 ML: 350 INJECTION, SOLUTION INTRAVENOUS at 15:24

## 2025-07-30 RX ADMIN — FENTANYL CITRATE 50 MCG: 50 INJECTION, SOLUTION INTRAMUSCULAR; INTRAVENOUS at 15:15

## 2025-07-30 RX ADMIN — ROSUVASTATIN CALCIUM 10 MG: 10 TABLET, FILM COATED ORAL at 08:21

## 2025-07-30 RX ADMIN — SODIUM CHLORIDE 50 ML/HR: 9 INJECTION, SOLUTION INTRAVENOUS at 15:04

## 2025-07-30 RX ADMIN — Medication 2 DOSE: at 15:04

## 2025-07-30 RX ADMIN — ASPIRIN 81 MG: 81 TABLET, DELAYED RELEASE ORAL at 08:21

## 2025-07-30 ASSESSMENT — COGNITIVE AND FUNCTIONAL STATUS - GENERAL
DAILY ACTIVITIY SCORE: 24
DAILY ACTIVITIY SCORE: 24
MOBILITY SCORE: 24
MOBILITY SCORE: 24

## 2025-07-30 ASSESSMENT — PAIN SCALES - GENERAL
PAINLEVEL_OUTOF10: 0 - NO PAIN

## 2025-07-30 ASSESSMENT — PAIN - FUNCTIONAL ASSESSMENT
PAIN_FUNCTIONAL_ASSESSMENT: 0-10

## 2025-07-30 NOTE — SIGNIFICANT EVENT
Pt returned to [facility] 5 from lab 1, right femoral MYNX in place, pt resting comfortably, eating and drinking, no c/o at this time

## 2025-07-30 NOTE — POST-PROCEDURE NOTE
Physician Transition of Care Summary  Invasive Cardiovascular Lab    Procedure Date: 7/30/2025  Attending:    Susan Colorado - Primary  Resident/Fellow/Other Assistant: Surgeons and Role:  * No surgeons found with a matching role *    Indications:   Pre-op Diagnosis      * Chest pain, unspecified type [R07.9]    Post-procedure diagnosis:   Post-op Diagnosis     * Chest pain, unspecified type [R07.9]    Procedure(s):   Left Heart Catheterization [facility] No LV/Coronary Only  01569 - HI CATH PLACEMENT & NJX CORONARY ART ANGIO IMG S&I        Procedure Findings:   NORMAL CORONARIES     Description of the Procedure:   [facility]    Complications:   NONE    Stents/Implants:   Implants       No implant documentation for this case.            Anticoagulation/Antiplatelet Plan:   NA    Estimated Blood Loss:   * No values recorded between 7/30/2025  2:59 PM and 7/30/2025  3:24 PM *    Anesthesia: Moderate Sedation Anesthesia Staff: No anesthesia staff entered.    Any Specimen(s) Removed:   No specimens collected during this procedure.    Disposition:   FLOOR      Electronically signed by: Maxi Colorado MD, 7/30/2025 3:24 PM

## 2025-07-30 NOTE — PROGRESS NOTES
Magda Jaimes is a 64 y.o. female on day 0 of admission presenting with Syncope and collapse.      Subjective   The patient has no complaints of pain or nausea this morning.        Objective     Last Recorded Vitals  /83 (BP Location: Left arm, Patient Position: Standing)   Pulse 65   Temp 36.9 °C (98.4 °F) (Temporal)   Resp 17   Wt 82.6 kg (182 lb)   SpO2 95%   Intake/Output last 3 Shifts:  No intake or output data in the 24 hours ending 07/30/25 1510      Admission Weight  Weight: 82.6 kg (182 lb) (07/28/25 0942)    Daily Weight  07/28/25 : 82.6 kg (182 lb)    Image Results  Transthoracic Echo Complete     [facility], [address] [place] [address]                Tel [phone] and Fax [phone]    TRANSTHORACIC ECHOCARDIOGRAM REPORT       Patient Name:       MAGDA JAIMES   Reading Physician:    Benji Roque MD  Study Date:         7/29/2025           Ordering Provider:    [id number] REMY CASE  MRN/PID:            [id number]            Fellow:  Accession#:         [id number]        Nurse:  Date of Birth/Age:  1960 / 64      Sonographer:          Odalys flowers  Gender assigned at  F                   Additional Staff:  Birth:  Height:             167.64 cm           Admit Date:           7/27/2025  Weight:             82.55 kg            Admission Status:     Inpatient -                                                                Routine  BSA / BMI:          1.92 m2 / 29.38     Encounter#:           [id number]                      kg/m2  Blood Pressure:     138/82 mmHg         Department Location:  [facility] Non                                                                Invasive    Study Type:    TRANSTHORACIC ECHO (TTE) COMPLETE  Diagnosis/ICD: Syncope-R55  Indication:    Syncope  CPT Code:       Echo Complete w Full Doppler-14831    Patient History:  Pertinent History: HTN and Hyperlipidemia.    Study Detail: The following Echo studies were performed: M-Mode, Doppler, 2D and                color flow.       PHYSICIAN INTERPRETATION:  Left Ventricle: Left ventricular ejection fraction is normal calculated by Espinoza's biplane at 65%. There is left ventricular hypertrophy involving the basal wall, with an asymmetric distribution. There are no regional left ventricular wall motion abnormalities. The left ventricular cavity size is normal. There is mildly increased septal and mildly increased posterior left ventricular wall thickness. Spectral Doppler shows a normal pattern of left ventricular diastolic filling.  Left Atrium: The left atrial size is normal.  Right Ventricle: The right ventricle is normal in size. There is normal right ventricular global systolic function.  Right Atrium: The right atrium is normal in size.  Aortic Valve: The aortic valve is trileaflet. There is trace aortic valve regurgitation.  Mitral Valve: The mitral valve is normal in structure. There is trace mitral valve regurgitation. The E Vmax is 0.90 m/s. There is normal flow pattern in the pulmonary veins.  Tricuspid Valve: The tricuspid valve is structurally normal. There is trace to mild tricuspid regurgitation.  Pulmonic Valve: The pulmonic valve is not well visualized. The pulmonic valve regurgitation was not well visualized.  Pericardium: There is no pericardial effusion noted.  Aorta: The aortic root is normal. The Asc Ao is 3.60 cm.  Systemic Veins: The inferior vena cava appears normal in size, with IVC inspiratory collapse greater than 50%.       CONCLUSIONS:   1. Left ventricular ejection fraction is normal calculated by Espinoza's biplane at 65%.   2. There is normal right ventricular global systolic function.    QUANTITATIVE DATA SUMMARY:     2D MEASUREMENTS:          Normal Ranges:  IVSd:            1.18 cm   (0.6-1.1cm)  LVPWd:           0.96 cm  (0.6-1.1cm)  LVIDd:           3.99 cm  (3.9-5.9cm)  LV Mass Index:   73 g/m2  LVEDV Index:     48 ml/m2       LEFT ATRIUM:                  Normal Ranges:  LA Vol A4C:        32.9 ml    (22+/-6mL/m2)  LA Vol A2C:        53.5 ml  LA Vol BP:         42.0 ml  LA Vol Index A4C:  17.1ml/m2  LA Vol Index A2C:  27.8 ml/m2  LA Vol Index BP:   21.8 ml/m2  LA Area A4C:       14.6 cm2  LA Area A2C:       18.6 cm2  LA Major Axis A4C: 5.5 cm  LA Major Axis A2C: 5.5 cm  LA Vol A4C:        31.3 ml  LA Vol A2C:        49.5 ml  LA Vol Index BSA:  21.0 ml/m2       RIGHT ATRIUM:                 Normal Ranges:  RA Vol A4C:        32.5 ml    (8.3-19.5ml)  RA Vol Index A4C:  16.9 ml/m2  RA Area A4C:       13.4 cm2  RA Major Axis A4C: 4.7 cm       AORTA MEASUREMENTS:         Normal Ranges:  Asc Ao, d:          3.60 cm (2.1-3.4cm)       LV SYSTOLIC FUNCTION:                       Normal Ranges:  EF-A4C View:    67 % (>=55%)  EF-A2C View:    63 %  EF-Biplane:     65 %  LV EF Reported: 65 %       LV DIASTOLIC FUNCTION:             Normal Ranges:  MV Peak E:             0.90 m/s    (0.7-1.2 m/s)  MV Peak A:             1.13 m/s    (0.42-0.7 m/s)  E/A Ratio:             0.80        (1.0-2.2)  MV e'                  0.090 m/s   (>8.0)  MV lateral e'          0.11 m/s  MV medial e'           0.07 m/s  MV A Dur:              126.87 msec  E/e' Ratio:            10.01       (<8.0)       MITRAL VALVE:          Normal Ranges:  MV DT:        289 msec (150-240msec)       AORTIC VALVE:           Normal Ranges:  AoV Vmax:     1.88 m/s  (<=1.7m/s)  AoV Peak P.2 mmHg (<20mmHg)       TRICUSPID VALVE/RVSP:         Normal Ranges:  Est. RA Pressure:     3  IVC Diam:             1.30 cm       [id number] Dani Roque MD  Electronically signed on 2025 at 2:47:28 PM       ** Final **  Lower extremity venous duplex bilateral  Preliminary Cardiology Report            [facility]  [address] [place] [address]  [place] [address]   Tel [phone] and Fax [phone]               Preliminary Vascular Lab Report     [facility] US LOWER EXTREMITY VENOUS DUPLEX BILATERAL       Patient Name:      RADHA Elliott Physician: [id number] BJORN Griggs MD                               RPVI  Study Date:        7/29/2025        Ordering           [id number] CAROL BRADSHAW LIMA                                      Physician:  MRN/PID:           [id number]         Technologist:      Gabriela Neff RVT  Accession#:        [id number]     Technologist 2:  Date of Birth/Age: 1960        Encounter#:        [id number]  Gender:            F  Admission Status:  Inpatient        Location           [facility]                                      Performed:       Diagnosis/ICD: Localized (leg) edema-R60.0  Procedure/CPT: 34058 Peripheral venous duplex scan for DVT complete       PRELIMINARY CONCLUSIONS:  Right Lower Venous: No evidence of acute deep vein thrombus visualized in the right lower extremity.  Left Lower Venous: No evidence of acute deep vein thrombus visualized in the left lower extremity.     Imaging & Doppler Findings:     Right                 Compressible Thrombus        Flow  Distal External Iliac                       Spontaneous/Phasic  CFV                       Yes        None   Spontaneous/Phasic  PFV                       Yes        None  FV Proximal               Yes        None   Spontaneous/Phasic  FV Mid                    Yes        None  FV Distal                 Yes        None  Popliteal                 Yes        None   Spontaneous/Phasic  Peroneal                  Yes        None  PTV                       Yes        None       Left                  Compress Thrombus        Flow  Distal External Iliac            None   Spontaneous/Phasic  CFV                     Yes      None   Spontaneous/Phasic  PFV                     Yes      None  FV Proximal             Yes      None    Spontaneous/Phasic  FV Mid                  Yes      None  FV Distal               Yes      None  Popliteal               Yes      None   Spontaneous/Phasic  Peroneal                Yes      None  PTV                     Yes      None    VASCULAR PRELIMINARY REPORT  completed by Gabriela Neff RVT on 7/29/2025 at 1:01:15 PM       ** Final **  ECG 12 lead  Normal sinus rhythm  Normal ECG  When compared with ECG of 02-MAY-2025 15:37,  No significant change was found      Physical Exam    Eyes:      Extraocular Movements: Extraocular movements intact.       Cardiovascular:      Rate and Rhythm: Regular rhythm. Bradycardia present.      Heart sounds: Murmur heard.     Musculoskeletal:      Right lower leg: No edema.      Left lower leg: No edema.     Skin:     General: Skin is warm and dry.     Neurological:      General: No focal deficit present.      Mental Status: She is alert and oriented to person, place, and time.     Psychiatric:         Mood and Affect: Mood normal.         Behavior: Behavior normal.         Thought Content: Thought content normal.         Judgment: Judgment normal.         Relevant Results           Results for orders placed or performed during the hospital encounter of 07/28/25 (from the past 24 hours)   Comprehensive Metabolic Panel   Result Value Ref Range    Glucose 99 74 - 99 mg/dL    Sodium 135 (L) 136 - 145 mmol/L    Potassium 3.6 3.5 - 5.3 mmol/L    Chloride 100 98 - 107 mmol/L    Bicarbonate 25 21 - 32 mmol/L    Anion Gap 14 10 - 20 mmol/L    Urea Nitrogen 10 6 - 23 mg/dL    Creatinine 0.63 0.50 - 1.05 mg/dL    eGFR >90 >60 mL/min/1.73m*2    Calcium 9.0 8.6 - 10.3 mg/dL    Albumin 4.0 3.4 - 5.0 g/dL    Alkaline Phosphatase 59 33 - 136 U/L    Total Protein 6.9 6.4 - 8.2 g/dL    AST 20 9 - 39 U/L    Bilirubin, Total 0.7 0.0 - 1.2 mg/dL    ALT 23 7 - 45 U/L   CBC and Auto Differential   Result Value Ref Range    WBC 6.4 4.4 - 11.3 x10*3/uL    nRBC 0.0 0.0 - 0.0 /100 WBCs    RBC 4.45 4.00  - 5.20 x10*6/uL    Hemoglobin 13.9 12.0 - 16.0 g/dL    Hematocrit 40.3 36.0 - 46.0 %    MCV 91 80 - 100 fL    MCH 31.2 26.0 - 34.0 pg    MCHC 34.5 32.0 - 36.0 g/dL    RDW 12.1 11.5 - 14.5 %    Platelets 260 150 - 450 x10*3/uL    Neutrophils % 58.2 40.0 - 80.0 %    Immature Granulocytes %, Automated 0.2 0.0 - 0.9 %    Lymphocytes % 31.5 13.0 - 44.0 %    Monocytes % 8.5 2.0 - 10.0 %    Eosinophils % 1.1 0.0 - 6.0 %    Basophils % 0.5 0.0 - 2.0 %    Neutrophils Absolute 3.75 1.20 - 7.70 x10*3/uL    Immature Granulocytes Absolute, Automated 0.01 0.00 - 0.70 x10*3/uL    Lymphocytes Absolute 2.03 1.20 - 4.80 x10*3/uL    Monocytes Absolute 0.55 0.10 - 1.00 x10*3/uL    Eosinophils Absolute 0.07 0.00 - 0.70 x10*3/uL    Basophils Absolute 0.03 0.00 - 0.10 x10*3/uL   Lipid Panel   Result Value Ref Range    Cholesterol 135 0 - 199 mg/dL    HDL-Cholesterol 44.1 mg/dL    Cholesterol/HDL Ratio 3.1     LDL Calculated 59 <=99 mg/dL    VLDL 32 0 - 40 mg/dL    Triglycerides 158 (H) 0 - 149 mg/dL    Non HDL Cholesterol 91 0 - 149 mg/dL                      Assessment & Plan    Magda Jaimes is a 64 y.o. year old female with PMH HTN, HLD, preDM presented to the ED sp syncopal episode.    Syncope and collapse  -This could be related to hypotension, since this occurred after receiving 2 nitro glycerin sublingual tabs.  -CT head shows no acute infarct, hemorrhage or mass  -Orthostatics normal  -Will continue telemetry monitoring  -D-dimer 430  -US BLE negative for DVT  -Echocardiogram pending  -Cardiology plans [facility] in the morning, 7/30/25    Chest pain  -Will continue aspirin  -Continue telemetry monitoring  -Treating nausea  -[facility] this afternoon    Incidental finding: Tornwaldt cyst  -Benign per MRI     Hypotension- resolved  History of HTN  - BP low in route  - Will continue to antihypertensives  - Monitor BP    HLD  - statin  - ASA     Dispo: [facility] this afternoon.    Beth Cooper, APRN-CNP

## 2025-07-30 NOTE — PROGRESS NOTES
07/30/25 1125   Discharge Planning   Living Arrangements Spouse/significant other   Support Systems Spouse/significant other   Assistance Needed Alert and oriented x 4, Independent with ADL's, Drives, No DME used, Room air at baseline and currently, No CPAP/Bipap, PCP-Heladio Castaneda PA-C   Type of Residence Private residence   Number of Stairs to Enter Residence 5   Number of Stairs Within Residence 24  (12 steps upstairs and 12 steps to basement)   Do you have animals or pets at home? No   Who is requesting discharge planning? Provider   Home or Post Acute Services None   Expected Discharge Disposition Home   Does the patient need discharge transport arranged? No   Patient Choice   Provider Choice list and CMS website (https://medicare.gov/care-compare#search) for post-acute Quality and Resource Measure Data were provided and reviewed with: Patient;Family   Patient / Family choosing to utilize agency / facility established prior to hospitalization No   Intensity of Service   Intensity of Service 0-30 min

## 2025-07-30 NOTE — PRE-SEDATION DOCUMENTATION
Sedation Plan    ASA 1     Mallampati class: I.    Risks, benefits, and alternatives discussed with patient.

## 2025-07-31 VITALS
WEIGHT: 182 LBS | BODY MASS INDEX: 29.25 KG/M2 | HEIGHT: 66 IN | TEMPERATURE: 98.4 F | HEART RATE: 74 BPM | RESPIRATION RATE: 16 BRPM | OXYGEN SATURATION: 93 % | SYSTOLIC BLOOD PRESSURE: 141 MMHG | DIASTOLIC BLOOD PRESSURE: 85 MMHG

## 2025-07-31 PROBLEM — R07.9 CHEST PAIN: Status: RESOLVED | Noted: 2025-07-28 | Resolved: 2025-07-31

## 2025-07-31 PROBLEM — R55 SYNCOPE AND COLLAPSE: Status: RESOLVED | Noted: 2025-07-28 | Resolved: 2025-07-31

## 2025-07-31 LAB
ALBUMIN SERPL BCP-MCNC: 4 G/DL (ref 3.4–5)
ALP SERPL-CCNC: 55 U/L (ref 33–136)
ALT SERPL W P-5'-P-CCNC: 23 U/L (ref 7–45)
ANION GAP SERPL CALC-SCNC: 12 MMOL/L (ref 10–20)
AST SERPL W P-5'-P-CCNC: 20 U/L (ref 9–39)
BASOPHILS # BLD AUTO: 0.04 X10*3/UL (ref 0–0.1)
BASOPHILS NFR BLD AUTO: 0.6 %
BILIRUB SERPL-MCNC: 0.8 MG/DL (ref 0–1.2)
BUN SERPL-MCNC: 11 MG/DL (ref 6–23)
CALCIUM SERPL-MCNC: 9.2 MG/DL (ref 8.6–10.3)
CHLORIDE SERPL-SCNC: 98 MMOL/L (ref 98–107)
CO2 SERPL-SCNC: 28 MMOL/L (ref 21–32)
CREAT SERPL-MCNC: 0.67 MG/DL (ref 0.5–1.05)
EGFRCR SERPLBLD CKD-EPI 2021: >90 ML/MIN/1.73M*2
EOSINOPHIL # BLD AUTO: 0.07 X10*3/UL (ref 0–0.7)
EOSINOPHIL NFR BLD AUTO: 1 %
ERYTHROCYTE [DISTWIDTH] IN BLOOD BY AUTOMATED COUNT: 12.1 % (ref 11.5–14.5)
GLUCOSE SERPL-MCNC: 93 MG/DL (ref 74–99)
HCT VFR BLD AUTO: 39.9 % (ref 36–46)
HGB BLD-MCNC: 13.7 G/DL (ref 12–16)
IMM GRANULOCYTES # BLD AUTO: 0.02 X10*3/UL (ref 0–0.7)
IMM GRANULOCYTES NFR BLD AUTO: 0.3 % (ref 0–0.9)
LYMPHOCYTES # BLD AUTO: 2.3 X10*3/UL (ref 1.2–4.8)
LYMPHOCYTES NFR BLD AUTO: 31.7 %
MCH RBC QN AUTO: 31.3 PG (ref 26–34)
MCHC RBC AUTO-ENTMCNC: 34.3 G/DL (ref 32–36)
MCV RBC AUTO: 91 FL (ref 80–100)
MONOCYTES # BLD AUTO: 0.68 X10*3/UL (ref 0.1–1)
MONOCYTES NFR BLD AUTO: 9.4 %
NEUTROPHILS # BLD AUTO: 4.14 X10*3/UL (ref 1.2–7.7)
NEUTROPHILS NFR BLD AUTO: 57 %
NRBC BLD-RTO: 0 /100 WBCS (ref 0–0)
PLATELET # BLD AUTO: 247 X10*3/UL (ref 150–450)
POTASSIUM SERPL-SCNC: 3.7 MMOL/L (ref 3.5–5.3)
PROT SERPL-MCNC: 6.7 G/DL (ref 6.4–8.2)
RBC # BLD AUTO: 4.38 X10*6/UL (ref 4–5.2)
SODIUM SERPL-SCNC: 134 MMOL/L (ref 136–145)
WBC # BLD AUTO: 7.3 X10*3/UL (ref 4.4–11.3)

## 2025-07-31 PROCEDURE — 99239 HOSP IP/OBS DSCHRG MGMT >30: CPT | Performed by: NURSE PRACTITIONER

## 2025-07-31 PROCEDURE — 84075 ASSAY ALKALINE PHOSPHATASE: CPT | Performed by: STUDENT IN AN ORGANIZED HEALTH CARE EDUCATION/TRAINING PROGRAM

## 2025-07-31 PROCEDURE — 94760 N-INVAS EAR/PLS OXIMETRY 1: CPT

## 2025-07-31 PROCEDURE — 36415 COLL VENOUS BLD VENIPUNCTURE: CPT | Performed by: STUDENT IN AN ORGANIZED HEALTH CARE EDUCATION/TRAINING PROGRAM

## 2025-07-31 PROCEDURE — 2500000001 HC RX 250 WO HCPCS SELF ADMINISTERED DRUGS (ALT 637 FOR MEDICARE OP): Performed by: STUDENT IN AN ORGANIZED HEALTH CARE EDUCATION/TRAINING PROGRAM

## 2025-07-31 PROCEDURE — 85025 COMPLETE CBC W/AUTO DIFF WBC: CPT | Performed by: STUDENT IN AN ORGANIZED HEALTH CARE EDUCATION/TRAINING PROGRAM

## 2025-07-31 PROCEDURE — 2500000002 HC RX 250 W HCPCS SELF ADMINISTERED DRUGS (ALT 637 FOR MEDICARE OP, ALT 636 FOR OP/ED): Performed by: STUDENT IN AN ORGANIZED HEALTH CARE EDUCATION/TRAINING PROGRAM

## 2025-07-31 PROCEDURE — G0378 HOSPITAL OBSERVATION PER HR: HCPCS

## 2025-07-31 RX ORDER — ASPIRIN 81 MG/1
81 TABLET ORAL DAILY
Start: 2025-08-01 | End: 2025-08-31

## 2025-07-31 RX ADMIN — ASPIRIN 81 MG: 81 TABLET, DELAYED RELEASE ORAL at 08:03

## 2025-07-31 RX ADMIN — ROSUVASTATIN CALCIUM 10 MG: 10 TABLET, FILM COATED ORAL at 08:03

## 2025-07-31 ASSESSMENT — PAIN - FUNCTIONAL ASSESSMENT: PAIN_FUNCTIONAL_ASSESSMENT: 0-10

## 2025-07-31 ASSESSMENT — COGNITIVE AND FUNCTIONAL STATUS - GENERAL
MOBILITY SCORE: 24
DAILY ACTIVITIY SCORE: 24

## 2025-07-31 ASSESSMENT — PAIN SCALES - GENERAL: PAINLEVEL_OUTOF10: 0 - NO PAIN

## 2025-07-31 NOTE — PROGRESS NOTES
Magda Jaimes is a 64 y.o. female on day 0 of admission presenting with Syncope and collapse.      Subjective   She is sitting up in the bed, no complaints. Dressing removed from right groin.      Review of systems:  Constitutional: negative for fever, chills, or malaise  Neuro: negative for dizziness, headache, numbness, tingling  ENT: Negative for nasal congestion or sore throat  Resp: negative for shortness of breath, cough, or wheezing  CV: negative for chest pain, palpitations  GI: negative for abd pain, nausea, vomiting or diarrhea  : negative for dysuria, frequency, or urgency  Skin: negative for lesions, wounds, or rash  Musculoskeletal: Negative for weakness, myalgia, or arthralgia  Endocrine: Negative for polyuria or polydipsia         Objective   Constitutional: Well developed, awake/alert/oriented x3, no distress, alert and cooperative  Eyes: PERRL, EOMI, clear sclera  ENMT: mucous membranes moist, no apparent injury, no lesions seen  Head/Neck: Neck supple, no apparent injury, thyroid without mass or tenderness, No JVD, trachea midline, no bruits  Respiratory/Thorax: Patent airways, CTAB, normal breath sounds with good chest expansion, thorax symmetric  Cardiovascular: Regular, rate and rhythm, no murmurs, 2+ equal pulses of the extremities, normal S 1and S 2  Gastrointestinal: Nondistended, soft, non-tender, no rebound tenderness or guarding, no masses palpable, no organomegaly, +BS, no bruits  Musculoskeletal: ROM intact, no joint swelling, normal strength  Extremities: normal extremities, no cyanosis edema, contusions or wounds, no clubbing  Neurological: alert and oriented x3, intact senses, motor, response and reflexes, normal strength  Lymphatic: No significant lymphadenopathy  Psychological: Appropriate mood and behavior  Skin: Warm and dry, no lesions, no rashes      Last Recorded Vitals  /85 (BP Location: Left arm, Patient Position: Lying)   Pulse 74   Temp 36.9 °C (98.4 °F)  "(Temporal)   Resp 16   Ht 1.676 m (5' 6\")   Wt 82.6 kg (182 lb)   SpO2 93%   BMI 29.38 kg/m²     Intake/Output last 3 Shifts:  I/O last 3 completed shifts:  In: 480 (5.8 mL/kg) [P.O.:480]  Out: 20 (0.2 mL/kg) [Blood:20]  Weight: 82.6 kg   No intake/output data recorded.    Relevant Results  Scheduled medications  Scheduled Medications[1]  Continuous medications  Continuous Medications[2]  PRN medications  PRN Medications[3]    Results for orders placed or performed during the hospital encounter of 07/28/25 (from the past 24 hours)   Comprehensive Metabolic Panel   Result Value Ref Range    Glucose 93 74 - 99 mg/dL    Sodium 134 (L) 136 - 145 mmol/L    Potassium 3.7 3.5 - 5.3 mmol/L    Chloride 98 98 - 107 mmol/L    Bicarbonate 28 21 - 32 mmol/L    Anion Gap 12 10 - 20 mmol/L    Urea Nitrogen 11 6 - 23 mg/dL    Creatinine 0.67 0.50 - 1.05 mg/dL    eGFR >90 >60 mL/min/1.73m*2    Calcium 9.2 8.6 - 10.3 mg/dL    Albumin 4.0 3.4 - 5.0 g/dL    Alkaline Phosphatase 55 33 - 136 U/L    Total Protein 6.7 6.4 - 8.2 g/dL    AST 20 9 - 39 U/L    Bilirubin, Total 0.8 0.0 - 1.2 mg/dL    ALT 23 7 - 45 U/L   CBC and Auto Differential   Result Value Ref Range    WBC 7.3 4.4 - 11.3 x10*3/uL    nRBC 0.0 0.0 - 0.0 /100 WBCs    RBC 4.38 4.00 - 5.20 x10*6/uL    Hemoglobin 13.7 12.0 - 16.0 g/dL    Hematocrit 39.9 36.0 - 46.0 %    MCV 91 80 - 100 fL    MCH 31.3 26.0 - 34.0 pg    MCHC 34.3 32.0 - 36.0 g/dL    RDW 12.1 11.5 - 14.5 %    Platelets 247 150 - 450 x10*3/uL    Neutrophils % 57.0 40.0 - 80.0 %    Immature Granulocytes %, Automated 0.3 0.0 - 0.9 %    Lymphocytes % 31.7 13.0 - 44.0 %    Monocytes % 9.4 2.0 - 10.0 %    Eosinophils % 1.0 0.0 - 6.0 %    Basophils % 0.6 0.0 - 2.0 %    Neutrophils Absolute 4.14 1.20 - 7.70 x10*3/uL    Immature Granulocytes Absolute, Automated 0.02 0.00 - 0.70 x10*3/uL    Lymphocytes Absolute 2.30 1.20 - 4.80 x10*3/uL    Monocytes Absolute 0.68 0.10 - 1.00 x10*3/uL    Eosinophils Absolute 0.07 0.00 - " 0.70 x10*3/uL    Basophils Absolute 0.04 0.00 - 0.10 x10*3/uL       Transthoracic Echo Complete   Final Result      Lower extremity venous duplex bilateral   Final Result      MR brain wo IV contrast   Final Result   No acute infarct, hemorrhage or mass is noted.        The parenchymal hyperintensities are nonspecific and may be secondary   to chronic microvascular ischemic, degenerative, demyelinating or   other etiologies.        MACRO:   None        Signed by: Dewey Jones 7/28/2025 12:23 PM   Dictation workstation:   [id number]      CT brain attack angio head and neck W and WO IV contrast   Final Result   No evidence for significant stenosis of the cervical vessels.        No evidence for significant stenosis or large branch vessel cutoffs   of the intracranial vessels.             Findings communicated with Suni Crowe MD by myself via secure   epic chat at 0949 hours on 07/28/2025        MACRO:   None        Signed by: Joo England 7/28/2025 9:49 AM   Dictation workstation:   [id number]      CT brain attack head wo IV contrast   Final Result   No CT evidence of acute intracranial abnormality.        Chronic senescent changes including white matter disease commonly   attributed to chronic microvascular ischemia and age-appropriate   volume loss.        Findings communicated with the Suni Crowe M.D. by myself via   secure epic chat at 0938 hours on 07/28/2025        MACRO:   None             Signed by: Joo England 7/28/2025 9:38 AM   Dictation workstation:   [id number]      Cardiac Catheterization Procedure    (Results Pending)       Transthoracic Echo Complete  Result Date: 7/29/2025   [facility], [address] [place] [address]               Tel [phone] and Fax [phone] TRANSTHORACIC ECHOCARDIOGRAM REPORT  Patient Name:       RADHA Elliott Physician:    [id number] Dani Roque MD Study  Date:         7/29/2025           Ordering Provider:    [id number] REMY CASE MRN/PID:            [id number]            Fellow: Accession#:         [id number]        Nurse: Date of Birth/Age:  1960 / 64      Sonographer:          Odalys flowers Gender assigned at  F                   Additional Staff: Birth: Height:             167.64 cm           Admit Date:           7/27/2025 Weight:             82.55 kg            Admission Status:     Inpatient -                                                               Routine BSA / BMI:          1.92 m2 / 29.38     Encounter#:           [id number]                     kg/m2 Blood Pressure:     138/82 mmHg         Department Location:  [facility]                                                               Invasive Study Type:    TRANSTHORACIC ECHO (TTE) COMPLETE Diagnosis/ICD: Syncope-R55 Indication:    Syncope CPT Code:      Echo Complete w Full Doppler-[id number] Patient History: Pertinent History: HTN and Hyperlipidemia. Study Detail: The following Echo studies were performed: M-Mode, Doppler, 2D and               color flow.  PHYSICIAN INTERPRETATION: Left Ventricle: Left ventricular ejection fraction is normal calculated by Espinoza's biplane at 65%. There is left ventricular hypertrophy involving the basal wall, with an asymmetric distribution. There are no regional left ventricular wall motion abnormalities. The left ventricular cavity size is normal. There is mildly increased septal and mildly increased posterior left ventricular wall thickness. Spectral Doppler shows a normal pattern of left ventricular diastolic filling. Left Atrium: The left atrial size is normal. Right Ventricle: The right ventricle is normal in size. There is normal right ventricular global systolic function. Right Atrium: The right atrium is normal in size. Aortic Valve: The aortic valve is  trileaflet. There is trace aortic valve regurgitation. Mitral Valve: The mitral valve is normal in structure. There is trace mitral valve regurgitation. The E Vmax is 0.90 m/s. There is normal flow pattern in the pulmonary veins. Tricuspid Valve: The tricuspid valve is structurally normal. There is trace to mild tricuspid regurgitation. Pulmonic Valve: The pulmonic valve is not well visualized. The pulmonic valve regurgitation was not well visualized. Pericardium: There is no pericardial effusion noted. Aorta: The aortic root is normal. The Asc Ao is 3.60 cm. Systemic Veins: The inferior vena cava appears normal in size, with IVC inspiratory collapse greater than 50%.  CONCLUSIONS:  1. Left ventricular ejection fraction is normal calculated by Espinoza's biplane at 65%.  2. There is normal right ventricular global systolic function. QUANTITATIVE DATA SUMMARY:  2D MEASUREMENTS:          Normal Ranges: IVSd:            1.18 cm  (0.6-1.1cm) LVPWd:           0.96 cm  (0.6-1.1cm) LVIDd:           3.99 cm  (3.9-5.9cm) LV Mass Index:   73 g/m2 LVEDV Index:     48 ml/m2  LEFT ATRIUM:                  Normal Ranges: LA Vol A4C:        32.9 ml    (22+/-6mL/m2) LA Vol A2C:        53.5 ml LA Vol BP:         42.0 ml LA Vol Index A4C:  17.1ml/m2 LA Vol Index A2C:  27.8 ml/m2 LA Vol Index BP:   21.8 ml/m2 LA Area A4C:       14.6 cm2 LA Area A2C:       18.6 cm2 LA Major Axis A4C: 5.5 cm LA Major Axis A2C: 5.5 cm LA Vol A4C:        31.3 ml LA Vol A2C:        49.5 ml LA Vol Index BSA:  21.0 ml/m2  RIGHT ATRIUM:                 Normal Ranges: RA Vol A4C:        32.5 ml    (8.3-19.5ml) RA Vol Index A4C:  16.9 ml/m2 RA Area A4C:       13.4 cm2 RA Major Axis A4C: 4.7 cm  AORTA MEASUREMENTS:         Normal Ranges: Asc Ao, d:          3.60 cm (2.1-3.4cm)  LV SYSTOLIC FUNCTION:                      Normal Ranges: EF-A4C View:    67 % (>=55%) EF-A2C View:    63 % EF-Biplane:     65 % LV EF Reported: 65 %  LV DIASTOLIC FUNCTION:              Normal Ranges: MV Peak E:             0.90 m/s    (0.7-1.2 m/s) MV Peak A:             1.13 m/s    (0.42-0.7 m/s) E/A Ratio:             0.80        (1.0-2.2) MV e'                  0.090 m/s   (>8.0) MV lateral e'          0.11 m/s MV medial e'           0.07 m/s MV A Dur:              126.87 msec E/e' Ratio:            10.01       (<8.0)  MITRAL VALVE:          Normal Ranges: MV DT:        289 msec (150-240msec)  AORTIC VALVE:           Normal Ranges: AoV Vmax:     1.88 m/s  (<=1.7m/s) AoV Peak P.2 mmHg (<20mmHg)  TRICUSPID VALVE/RVSP:         Normal Ranges: Est. RA Pressure:     3 IVC Diam:             1.30 cm  [id number] Dani Roque MD Electronically signed on 2025 at 2:47:28 PM  ** Final **            Assessment/Plan   Chest pain  -During stress test with syncope  -Troponins negative  -CT cardiac score last week 370  -I reviewed the EKG, no acute changes, ST elevation, or depression  -Echo as above  -s/p LHC showed normal coronary arteries  -Cont statin  -Ok to stop aspirin     2. Hypertension with hypotension  -BP low after syncopal event, now stable  -Hold lisinopril->resume at discharge  -2gm na diet  -monitor     3. Hyperlipidemia  -Cont statin         ADAM Posey-CNP         [1] [2] [3]

## 2025-07-31 NOTE — NURSING NOTE
Discharge instructions reviewed with patient. No questions at this time. Education given for new homegoing medications with type, uses, and most common side effects. Patient verbalized understanding. Handout on Heart Cath & Stroke given. Telemetry removed and left at nurses station, masimo removed and left at bedside. IV removed. Discharged home in stable condition.

## 2025-07-31 NOTE — DISCHARGE SUMMARY
Discharge Diagnosis  Syncope and collapse likely due to hypotension  Chest pain           Issues Requiring Follow-Up  Follow up status post cardiac cath    Discharge Meds     Medication List      CHANGE how you take these medications     aspirin 81 mg EC tablet; Take 1 tablet (81 mg) by mouth once daily.;   Start taking on: August 1, 2025; What changed: when to take this, reasons   to take this, Another medication with the same name was removed. Continue   taking this medication, and follow the directions you see here.     CONTINUE taking these medications     lisinopril 30 mg tablet; Take 1 tablet (30 mg) by mouth once daily.   rosuvastatin 10 mg tablet; Commonly known as: Crestor; Take 1 tablet (10   mg) by mouth once daily.       Test Results Pending At Discharge  Pending Labs       No current pending labs.            Hospital Course   Per HPI: Magda Jaimes is a 64 y.o. year old female with PMH HTN, HLD, preDM presented to the ED sp syncopal episode. She said she has been having SOB and multiple anti hypertensive med changes due to intolerance. She was sent to see cardio who ordered a stress test. She was on the treadmill when she had chest tightness. She was given nitro SL x2. She then had a syncopal episode. In the ED, she was slow to respond that there was concern for CVA that stroke alert was called. Tele neuro cleared pt. Hospitalist is then called for admission.     During this admission, the patient had no chest pain or lightheadedness. The LHC showed clean coronaries. The D. Dimer was normal and the BLE US was negative for DVT.     Discharge time >30 minutes     Pertinent Physical Exam At Time of Discharge  Physical Exam     Eyes:      Extraocular Movements: Extraocular movements intact.         Cardiovascular:      Rate and Rhythm: Regular rhythm. Bradycardia present.      Heart sounds: Murmur heard.      Musculoskeletal:      Right lower leg: No edema.      Left lower leg: No edema.      Skin:      General: Skin is warm and dry.      Neurological:      General: No focal deficit present.      Mental Status: She is alert and oriented to person, place, and time.      Psychiatric:         Mood and Affect: Mood normal.         Behavior: Behavior normal.         Thought Content: Thought content normal.         Judgment: Judgment normal.     Outpatient Follow-Up  Future Appointments   Date Time Provider Department Center   8/20/2025  1:00 PM Heladio Castaneda PA-C [id number]         ADAM Portillo-CNP

## 2025-07-31 NOTE — CARE PLAN
The clinical goals for the shift include pt will remain free of syncopal events throughout this shift    Over the shift, the patient made progress toward the following goals.     Problem: Pain - Adult  Goal: Verbalizes/displays adequate comfort level or baseline comfort level  Outcome: Progressing     Problem: Safety - Adult  Goal: Free from fall injury  Outcome: Progressing     Problem: Discharge Planning  Goal: Discharge to home or other facility with appropriate resources  Outcome: Progressing     Problem: Chronic Conditions and Co-morbidities  Goal: Patient's chronic conditions and co-morbidity symptoms are monitored and maintained or improved  Outcome: Progressing     Problem: Nutrition  Goal: Nutrient intake appropriate for maintaining nutritional needs  Outcome: Progressing

## 2025-08-01 ENCOUNTER — PATIENT OUTREACH (OUTPATIENT)
Dept: PRIMARY CARE | Facility: CLINIC | Age: 65
End: 2025-08-01
Payer: COMMERCIAL

## 2025-08-01 NOTE — ED PROVIDER NOTES
Chief Complaint: Syncope  HPI: This is a 64-year-old female, presenting to the emergency department by EMS for evaluation of a possible syncopal episode which occurred just prior to arrival.  [facility].  Per the report from this facility, the patient initially started with a treadmill test, however bent over and clutched her chest, so they aborted the treadmill test, and gave her medication.  On injecting the medication, the patient had a unresponsive episode.  They report that she was on the cardiac monitor the entire time, and did not have a rhythm change, however has been poorly responsive since the episode, and so they sent her to the emergency department.  Unclear of any seizure-like activity.  Patient is awake, and answer some questions, however is unable to contribute any further to the history at this time    Medical History[1]   Surgical History[2]    Physical Exam  Vitals and nursing note reviewed.   Constitutional:       Appearance: Normal appearance.   HENT:      Head: Normocephalic and atraumatic.      Mouth/Throat:      Mouth: Mucous membranes are moist.     Eyes:      Conjunctiva/sclera: Conjunctivae normal.       Cardiovascular:      Rate and Rhythm: Normal rate.   Pulmonary:      Effort: Pulmonary effort is normal.   Abdominal:      General: Abdomen is flat.      Palpations: Abdomen is soft.     Musculoskeletal:         General: Normal range of motion.      Cervical back: Normal range of motion.     Skin:     General: Skin is warm and dry.     Neurological:      Mental Status: She is alert. She is disoriented.      Motor: Weakness present.      Comments: Initially confused, answer some questions, however it takes her a long time to articulate the answer.  Left-sided pronator drift, and left sided lower extremity drift.  Initially she also had a right lower extremity weakness, however after lifting her leg up and telling her  to hold it up, she was pushing down on my hand and then finally held it up for an appropriate amount of time.  Sensation appears intact in the bilateral upper and lower extremities   Psychiatric:         Mood and Affect: Mood normal.            ED Course/MDM  Diagnoses as of 08/01/25 0047   Syncope, unspecified syncope type       This is a 64 y.o. female presenting to the ED for evaluation after a syncopal episode at her stress test.  On initial physical exam, the patient presents confused, and is only able to answer some questions appropriately.  She does have a left-sided pronator drift and a left lower extremity drift, however no other focal neurodeficits.  Given this concern for left-sided symptoms, a stroke alert was called, the patient was taken immediately to the CT scanner.  CT head and CT angio head and neck were nonconcerning for any acute findings.  She was evaluated by the stroke attending, who states that likely this is caused by lack of blood flow to the head from her stress test medication.  On repeat evaluation, the patient is much more awake, alert, and neurofindings have completely resolved.  Remainder of the lab work is grossly unremarkable.  MRI of the brain was also obtained, without any acute findings.  Given the overall presentation, and concern for syncopal episode during a stress test, I do feel that she would benefit from hospital admission.  Spoke with the hospitalist who ultimately accepted the patient in admission.  She was admitted in stable condition    Final Impression  1.  Syncope  Disposition/Plan: Admit  Condition at disposition: Stable.     Suni Crowe DO  Emergency Medicine Physician       [1]   Past Medical History:  Diagnosis Date    Allergy status to unspecified drugs, medicaments and biological substances     Multiple allergies    Pure hypercholesterolemia, unspecified     High cholesterol   [2]   Past Surgical History:  Procedure Laterality Date    CARDIAC  CATHETERIZATION N/A 7/30/2025    Procedure: Left Heart Catheterization LHC No LV/Coronary Only;  Surgeon: Maxi Colorado MD;  Location: [facility] Cardiac [facility] Lab;  Service: Cardiovascular;  Laterality: N/A;    HERNIA REPAIR  12/05/2016    Hernia Repair    HYSTERECTOMY  12/05/2016    Hysterectomy    OTHER SURGICAL HISTORY  12/05/2016    Breast Surgery Enlargement Procedure Bilateral    OTHER SURGICAL HISTORY  11/14/2018    Bladder surgery        Suni Crowe DO  08/01/25 0052

## 2025-08-01 NOTE — PROGRESS NOTES
Discharge Facility: [facility]    Discharge Diagnosis:    Syncope and collapse likely due to hypotension  Chest pain     Issues Requiring Follow-Up  Follow up status post cardiac cath      Admission Date: 7/28/2025   Discharge Date:  7/31/2025    PCP Appointment Date: Tasked to office     Specialist Appointment Date:     Follow up with Dr. River-Cardiology on 8/14/2025; 9:30am     Hospital Encounter and Summary Linked: Yes    ED to Hosp-Admission (Discharged) with Libia Mora DO; Suni Crowe DO; Alyssa Xiong MD (07/28/2025)       See discharge assessment below for further details     Wrap Up  Wrap Up Additional Comments: I spoke with patient today aware Dr. Mathews F/U 8/14, would like to see PCP asap to review meds as she feels not tolerating Lisinopril very well not a new med, DC summary states the following ,, She said she has been having SOB and multiple anti hypertensive med changes due to intolerance. patient aware of DC  instructions along with medication changes. Patient encouraged to call providers for any questions concerns or change in condition (8/1/2025 10:06 AM)    Engagement  Call Start Time: 1006 (8/1/2025 10:06 AM)    Medications  Medications reviewed with patient/caregiver?: Yes (8/1/2025 10:06 AM)  Is the patient having any side effects they believe may be caused by any medication additions or changes?: -- (Patient would like to discuss meds with PCP I sent message states feels not tolerating Lisinopril very well not a new med) (8/1/2025 10:06 AM)  Does the patient have all medications ordered at discharge?: -- (pt has meds) (8/1/2025 10:06 AM)  Care Management Interventions: -- (message to office) (8/1/2025 10:06 AM)  Prescription Comments: CHANGE how you take these medications     aspirin 81 mg EC tablet; Take 1 tablet (81 mg) by mouth once daily.;   Start taking on: August 1, 2025; What changed: when to take this, reasons   to take this, Another medication with  the same name was removed. Continue   taking this medication, and follow the directions you see here. (8/1/2025 10:06 AM)  Is the patient taking all medications as directed (includes completed medication regime)?: -- (pt has meds) (8/1/2025 10:06 AM)  Care Management Interventions: Provided patient education (8/1/2025 10:06 AM)  Medication Comments: Patient would like to discuss meds with PCP I sent message states feels not tolerating Lisinopril very well not a new med (8/1/2025 10:06 AM)    Appointments  Does the patient have a primary care provider?: Yes (8/1/2025 10:06 AM)  Care Management Interventions: Educated patient on importance of making appointment (8/1/2025 10:06 AM)  Care Management Interventions: Advised patient to keep appointment (8/1/2025 10:06 AM)    Self Management  What is the home health agency?: NA (8/1/2025 10:06 AM)  What Durable Medical Equipment (DME) was ordered?: NA (8/1/2025 10:06 AM)    Patient Teaching  Does the patient have access to their discharge instructions?: Yes (8/1/2025 10:06 AM)  Care Management Interventions: Reviewed instructions with patient (8/1/2025 10:06 AM)  What is the patient's perception of their health status since discharge?: -- (see note) (8/1/2025 10:06 AM)  Is the patient/caregiver able to teach back the hierarchy of who to call/visit for symptoms/problems? PCP, Specialist, Home Health nurse, Urgent Care, ED, 911: Yes (8/1/2025 10:06 AM)

## 2025-08-03 ENCOUNTER — APPOINTMENT (OUTPATIENT)
Dept: CARDIOLOGY | Facility: HOSPITAL | Age: 65
End: 2025-08-03
Payer: COMMERCIAL

## 2025-08-03 ENCOUNTER — TELEPHONE (OUTPATIENT)
Dept: PRIMARY CARE | Facility: CLINIC | Age: 65
End: 2025-08-03

## 2025-08-03 ENCOUNTER — APPOINTMENT (OUTPATIENT)
Dept: RADIOLOGY | Facility: HOSPITAL | Age: 65
End: 2025-08-03
Payer: COMMERCIAL

## 2025-08-03 ENCOUNTER — HOSPITAL ENCOUNTER (EMERGENCY)
Facility: HOSPITAL | Age: 65
Discharge: HOME | End: 2025-08-03
Attending: STUDENT IN AN ORGANIZED HEALTH CARE EDUCATION/TRAINING PROGRAM
Payer: COMMERCIAL

## 2025-08-03 VITALS
BODY MASS INDEX: 28.93 KG/M2 | TEMPERATURE: 98.4 F | OXYGEN SATURATION: 99 % | WEIGHT: 180 LBS | RESPIRATION RATE: 16 BRPM | DIASTOLIC BLOOD PRESSURE: 90 MMHG | SYSTOLIC BLOOD PRESSURE: 142 MMHG | HEART RATE: 65 BPM | HEIGHT: 66 IN

## 2025-08-03 DIAGNOSIS — R42 DIZZINESS: Primary | ICD-10-CM

## 2025-08-03 DIAGNOSIS — N94.9 ADNEXAL CYST: ICD-10-CM

## 2025-08-03 DIAGNOSIS — R91.1 PULMONARY NODULE: ICD-10-CM

## 2025-08-03 LAB
ALBUMIN SERPL BCP-MCNC: 4.8 G/DL (ref 3.4–5)
ALP SERPL-CCNC: 57 U/L (ref 33–136)
ALT SERPL W P-5'-P-CCNC: 21 U/L (ref 7–45)
ANION GAP SERPL CALC-SCNC: 11 MMOL/L (ref 10–20)
APPEARANCE UR: CLEAR
APTT PPP: 28 SECONDS (ref 26–36)
AST SERPL W P-5'-P-CCNC: 22 U/L (ref 9–39)
BASOPHILS # BLD AUTO: 0.03 X10*3/UL (ref 0–0.1)
BASOPHILS NFR BLD AUTO: 0.5 %
BILIRUB SERPL-MCNC: 0.7 MG/DL (ref 0–1.2)
BILIRUB UR STRIP.AUTO-MCNC: NEGATIVE MG/DL
BUN SERPL-MCNC: 7 MG/DL (ref 6–23)
CALCIUM SERPL-MCNC: 9.7 MG/DL (ref 8.6–10.3)
CARDIAC TROPONIN I PNL SERPL HS: 12 NG/L (ref 0–13)
CARDIAC TROPONIN I PNL SERPL HS: 3 NG/L (ref 0–13)
CHLORIDE SERPL-SCNC: 99 MMOL/L (ref 98–107)
CK SERPL-CCNC: 53 U/L (ref 0–215)
CO2 SERPL-SCNC: 29 MMOL/L (ref 21–32)
COLOR UR: COLORLESS
CREAT SERPL-MCNC: 0.65 MG/DL (ref 0.5–1.05)
EGFRCR SERPLBLD CKD-EPI 2021: >90 ML/MIN/1.73M*2
EOSINOPHIL # BLD AUTO: 0.05 X10*3/UL (ref 0–0.7)
EOSINOPHIL NFR BLD AUTO: 0.8 %
ERYTHROCYTE [DISTWIDTH] IN BLOOD BY AUTOMATED COUNT: 12.2 % (ref 11.5–14.5)
GLUCOSE SERPL-MCNC: 109 MG/DL (ref 74–99)
GLUCOSE UR STRIP.AUTO-MCNC: NORMAL MG/DL
HCT VFR BLD AUTO: 42.7 % (ref 36–46)
HGB BLD-MCNC: 14.8 G/DL (ref 12–16)
IMM GRANULOCYTES # BLD AUTO: 0.01 X10*3/UL (ref 0–0.7)
IMM GRANULOCYTES NFR BLD AUTO: 0.2 % (ref 0–0.9)
INR PPP: 1 (ref 0.9–1.1)
KETONES UR STRIP.AUTO-MCNC: NEGATIVE MG/DL
LACTATE SERPL-SCNC: 1.5 MMOL/L (ref 0.4–2)
LEUKOCYTE ESTERASE UR QL STRIP.AUTO: NEGATIVE
LYMPHOCYTES # BLD AUTO: 1.9 X10*3/UL (ref 1.2–4.8)
LYMPHOCYTES NFR BLD AUTO: 30.6 %
MAGNESIUM SERPL-MCNC: 2.24 MG/DL (ref 1.6–2.4)
MCH RBC QN AUTO: 31.5 PG (ref 26–34)
MCHC RBC AUTO-ENTMCNC: 34.7 G/DL (ref 32–36)
MCV RBC AUTO: 91 FL (ref 80–100)
MONOCYTES # BLD AUTO: 0.58 X10*3/UL (ref 0.1–1)
MONOCYTES NFR BLD AUTO: 9.3 %
NEUTROPHILS # BLD AUTO: 3.64 X10*3/UL (ref 1.2–7.7)
NEUTROPHILS NFR BLD AUTO: 58.6 %
NITRITE UR QL STRIP.AUTO: NEGATIVE
NRBC BLD-RTO: 0 /100 WBCS (ref 0–0)
PH UR STRIP.AUTO: 7.5 [PH]
PLATELET # BLD AUTO: 280 X10*3/UL (ref 150–450)
POTASSIUM SERPL-SCNC: 4.3 MMOL/L (ref 3.5–5.3)
PROT SERPL-MCNC: 7.5 G/DL (ref 6.4–8.2)
PROT UR STRIP.AUTO-MCNC: NEGATIVE MG/DL
PROTHROMBIN TIME: 11.4 SECONDS (ref 9.8–12.4)
RBC # BLD AUTO: 4.7 X10*6/UL (ref 4–5.2)
RBC # UR STRIP.AUTO: NEGATIVE MG/DL
SODIUM SERPL-SCNC: 135 MMOL/L (ref 136–145)
SP GR UR STRIP.AUTO: 1
UROBILINOGEN UR STRIP.AUTO-MCNC: NORMAL MG/DL
WBC # BLD AUTO: 6.2 X10*3/UL (ref 4.4–11.3)

## 2025-08-03 PROCEDURE — 74174 CTA ABD&PLVS W/CONTRAST: CPT | Performed by: RADIOLOGY

## 2025-08-03 PROCEDURE — 70450 CT HEAD/BRAIN W/O DYE: CPT

## 2025-08-03 PROCEDURE — 36415 COLL VENOUS BLD VENIPUNCTURE: CPT | Performed by: STUDENT IN AN ORGANIZED HEALTH CARE EDUCATION/TRAINING PROGRAM

## 2025-08-03 PROCEDURE — 2550000001 HC RX 255 CONTRASTS: Performed by: STUDENT IN AN ORGANIZED HEALTH CARE EDUCATION/TRAINING PROGRAM

## 2025-08-03 PROCEDURE — 71275 CT ANGIOGRAPHY CHEST: CPT | Performed by: RADIOLOGY

## 2025-08-03 PROCEDURE — 80053 COMPREHEN METABOLIC PANEL: CPT | Performed by: STUDENT IN AN ORGANIZED HEALTH CARE EDUCATION/TRAINING PROGRAM

## 2025-08-03 PROCEDURE — 81003 URINALYSIS AUTO W/O SCOPE: CPT | Performed by: STUDENT IN AN ORGANIZED HEALTH CARE EDUCATION/TRAINING PROGRAM

## 2025-08-03 PROCEDURE — 83605 ASSAY OF LACTIC ACID: CPT | Performed by: STUDENT IN AN ORGANIZED HEALTH CARE EDUCATION/TRAINING PROGRAM

## 2025-08-03 PROCEDURE — 84484 ASSAY OF TROPONIN QUANT: CPT | Performed by: STUDENT IN AN ORGANIZED HEALTH CARE EDUCATION/TRAINING PROGRAM

## 2025-08-03 PROCEDURE — 85025 COMPLETE CBC W/AUTO DIFF WBC: CPT | Performed by: STUDENT IN AN ORGANIZED HEALTH CARE EDUCATION/TRAINING PROGRAM

## 2025-08-03 PROCEDURE — 99285 EMERGENCY DEPT VISIT HI MDM: CPT | Mod: 25 | Performed by: STUDENT IN AN ORGANIZED HEALTH CARE EDUCATION/TRAINING PROGRAM

## 2025-08-03 PROCEDURE — 99221 1ST HOSP IP/OBS SF/LOW 40: CPT | Performed by: INTERNAL MEDICINE

## 2025-08-03 PROCEDURE — 83735 ASSAY OF MAGNESIUM: CPT | Performed by: STUDENT IN AN ORGANIZED HEALTH CARE EDUCATION/TRAINING PROGRAM

## 2025-08-03 PROCEDURE — 71275 CT ANGIOGRAPHY CHEST: CPT

## 2025-08-03 PROCEDURE — 82550 ASSAY OF CK (CPK): CPT | Performed by: STUDENT IN AN ORGANIZED HEALTH CARE EDUCATION/TRAINING PROGRAM

## 2025-08-03 PROCEDURE — 85610 PROTHROMBIN TIME: CPT | Performed by: STUDENT IN AN ORGANIZED HEALTH CARE EDUCATION/TRAINING PROGRAM

## 2025-08-03 PROCEDURE — 93005 ELECTROCARDIOGRAM TRACING: CPT

## 2025-08-03 PROCEDURE — 70450 CT HEAD/BRAIN W/O DYE: CPT | Performed by: RADIOLOGY

## 2025-08-03 RX ADMIN — IOHEXOL 90 ML: 350 INJECTION, SOLUTION INTRAVENOUS at 16:12

## 2025-08-03 ASSESSMENT — PAIN - FUNCTIONAL ASSESSMENT: PAIN_FUNCTIONAL_ASSESSMENT: 0-10

## 2025-08-03 ASSESSMENT — PAIN DESCRIPTION - DESCRIPTORS: DESCRIPTORS: SHARP;SHOOTING

## 2025-08-03 ASSESSMENT — PAIN DESCRIPTION - FREQUENCY: FREQUENCY: CONSTANT/CONTINUOUS

## 2025-08-03 ASSESSMENT — PAIN DESCRIPTION - ORIENTATION: ORIENTATION: LEFT

## 2025-08-03 ASSESSMENT — PAIN DESCRIPTION - LOCATION: LOCATION: LEG

## 2025-08-03 ASSESSMENT — PAIN SCALES - GENERAL: PAINLEVEL_OUTOF10: 8

## 2025-08-03 NOTE — Clinical Note
Good news! Your PAP and HPV tests were both normal. Be well! Is the patient on isolation?: No   Do you expect the patient to require telemetry (informational-only for bed management): Yes   Do you expect the patient to require observation or inpt? (informational-only for bed management): Observation

## 2025-08-03 NOTE — DISCHARGE INSTRUCTIONS
Please stop your lisinopril.  Keep track of your blood pressure.  Follow-up with your doctor on Tuesday.  Return if symptoms worsen.    CHEST ABDOMEN AND PELVIS:      No evidence of thoracoabdominal aortic aneurysm or dissection.      9 mm indeterminate left basilar pulmonary nodule unchanged from  10/18/2024. Continued follow-up to assess stability over at least a 2  year surveillance interval recommended.      Subcentimeter nodule in the right lobe of the thyroid.      Mild stranding in the right groin presumably related to recent  cardiac catheterization 07/30/2025.      Distal colon diverticulosis.      2.4 cm right adnexal cyst, possible postmenopausal ovarian cyst.  Consider pelvic ultrasound follow-up to assess stability.      Small umbilical hernia.      Other findings as described above.

## 2025-08-03 NOTE — PROGRESS NOTES
Called and spoke to patient, having complaints of fluctuating blood pressures, shortness of breath, and urinary complaints. Advise proceed to ER. Patient agrees.

## 2025-08-03 NOTE — CONSULTS
Consults    Reason For Consult  Dizziness    History Of Present Illness  Magda Jaimes is a 64 y.o. year old female with PMH HTN, HLD, preDM presented to the ED with various symptoms including dizziness and balance issues.    Patient has a history of hypertensive medication intolerances.  She has been started and discontinued on various antihypertensives due to intolerance.  She was admitted and discharged last week for syncopal episode related to stress test.  She was sent home on a higher dose of lisinopril that she had taken previously.  She reports that 1 hour after taking the lisinopril she has symptoms of dizziness, imbalance, headache, and feet cramping.  She also was concerned that she saw specks of white in her urine.    Patient reported to the ED with extensive workup.  CBC and BMP were essentially all unremarkable.  Urinalysis was negative. CT angio of the chest abdomen pelvis was negative. CT of the head was also negative.  On review of systems she reports no chest pain, she reports some shortness of breath, some nasal congestion, no constipation, no diarrhea no abdominal pain.            Past Medical History  She has a past medical history of Allergy status to unspecified drugs, medicaments and biological substances and Pure hypercholesterolemia, unspecified.    Surgical History  She has a past surgical history that includes Other surgical history (12/05/2016); Hysterectomy (12/05/2016); Hernia repair (12/05/2016); Other surgical history (11/14/2018); and Cardiac catheterization (N/A, 7/30/2025).     Social History  She reports that she has quit smoking. Her smoking use included cigarettes. She has never used smokeless tobacco. She reports that she does not drink alcohol and does not use drugs.    Family History  Family History[1]     Allergies  Bee venom protein (honey bee), Spironolactone, Pollen extracts, Seasonale (91) [levonorgestrel-ethinyl estrad], and Amlodipine    Review of Systems  As per  HPI       Last Recorded Vitals  /90   Pulse 65   Temp 36.9 °C (98.4 °F) (Temporal)   Resp 16   Wt 81.6 kg (180 lb)   SpO2 99%   Physical Exam  Con: awake, alert; no distress;   Eyes: conjunctiva wnl; EOMI;   ENMT: hearing intact; MMM;   MSK: ROM wnl; digits wnl;   Resp: normal work of breathing; no cyanosis;   Neuro: moving all extremities; no abnormal movements  1+ pitting edema to lower extremities (not new)  Psych: oriented to situation; affect wnl;    I had pt stand up and pt had no dizziness or balance issues. I checked orthostatic BP and it was negative     Reviewed imaging and blood work       Assessment/Plan     Medication intolerance: Pt with hx of intolerance to medications in the past.  Orthostatics were negative.  Patient was able to tolerate standing without any dizziness or balance issues on my exam.  I believe best course of action is that patient avoid lisinopril and be discharged to follow-up with her primary care doctor on Tuesday (already has an appt).  Patient took her dose of lisinopril this morning. I reviewed her BP's on her home log as well as in the chart.  BPs range from 120s to 130s systolic.  I do not believe that starting a new antihypertensive or continuing lisinopril would be tolerated by the patient.  Her pressures are low enough where it would be okay if she did not take anything until her PCP appointment.  Patient knows to come back if her symptoms worsen.  After I explained the negative workup patient was eager to go home.    Inge Murphy MD             [1] No family history on file.

## 2025-08-03 NOTE — ED PROVIDER NOTES
CC: Dizziness (Pt states dizziness and lightheadedness for a couple days and low and high BP readings at home. Also states sharp pain in left calf since this morning. Pts  states she has been increasingly forgetful. )     HPI:  Patient is a 64-year-old female who recently had a cardiac cath on Wednesday presenting the emergency department with dizziness and lightheadedness.  Feels like it may be secondary to her lisinopril.  States that she had previously stopped it and then when she restarted it all of the symptoms returned.  Also endorsing intermittent calf pain.  Despite triage note she states it is bilateral and feels like someone is squeezing her calfs.  She also feels like her toes are curling in and she is having bad cramps.  Patient noted today that she had floating white specks in her urine.  She correlates the majority of the symptoms for her lisinopril.  Patient brought in home blood pressure readings that are anywhere from 116/89 to 147/93.    Records Reviewed:  Recent available ED and inpatient notes reviewed in EMR.    PMHx/PSHx:  Per HPI.   - has a past medical history of Allergy status to unspecified drugs, medicaments and biological substances and Pure hypercholesterolemia, unspecified.  - has a past surgical history that includes Other surgical history (12/05/2016); Hysterectomy (12/05/2016); Hernia repair (12/05/2016); Other surgical history (11/14/2018); and Cardiac catheterization (N/A, 7/30/2025).  - has Mixed hyperlipidemia; IFG (impaired fasting glucose); Pre-diabetes; and Essential hypertension, benign on their problem list.    Medications:  Reviewed in EMR. See EMR for complete list of medications and doses.    Allergies:  Bee venom protein (honey bee), Spironolactone, Pollen extracts, Seasonale (91) [levonorgestrel-ethinyl estrad], and Amlodipine    Social History:  - Tobacco:  reports that she has quit smoking. Her smoking use included cigarettes. She has never used smokeless  tobacco.   - Alcohol:  reports no history of alcohol use.   - Illicit Drugs:  reports no history of drug use.     ROS:  Per HPI.       ???????????????????????????????????????????????????????????????  Triage Vitals:  T 36.9 °C (98.4 °F)  HR 72  BP (!) 135/92  RR 16  O2 98 % None (Room air)    Physical Exam  ???????????????????????????????????????????????????????????????  GEN: In acute distress  HEAD: atraumatic  EYES: PERRL, EOMI, no scleral icterus  ENT: mmm  NECK: no JVD, supple  CVS/CHEST: reg rate, nl rhythm  PULM: CTA b/l no wheezes, or rhonchi   GI: soft, NT/ND, no rebound or guarding   EXT: no LE edema, 2+ periph pulses in bilat radial and DP   NEURO: NIH 0.  Normal finger-nose-finger.  Answering questions appropriately.  No focal deficits.  Intact peripheral pulses.  No peripheral field cut.  No gaze deficit.  Awake and alert, Strength and sensation is equal in b/l upper and lower extremities, normal ambulation  SKIN: warm, dry  PSYCH: Anxious    Assessment and Plan:  Patient is a 64-year-old female presents the emergency department dizziness.  She has an NIH of 0.  No focal deficit.  Ambulatory with a steady gait.  Given the recent cardiac cath with associated dizziness did CT of her head as well as CT angio obtained.  Imaging reassuring.  Does have an incidental 9 mm pulmonary nodule that is unchanged.  Continues to need to be observed.  Patient's incidental findings noted on discharge paperwork.  Ultimately patient was admitted for dizziness after recent catheterization for telemetry monitoring.  Hospitalist came down to evaluate the patient.  Patient ultimately believes this is secondary to her lisinopril.  She has an appointment with her primary care doctor on Tuesday.  Will stop her lisinopril until she follows up with her primary pediatrician on Tuesday.  Given strict return precautions for chest pain, focal deficits or worsening dizziness.  Patient and  at bedside expressed understanding  and agreeable to plan.    ED Course:  ED Course as of 08/03/25 1822   Sun Aug 03, 2025   1331 EKG read by me reviewed by me sinus bradycardia 59 bpm.  Normal axis.  Normal intervals.  No significant ST segment elevation or depression. [HD]   1545 Called to bedside as patient did not want to have the CT angio.  However she states she feels like a lot of this is due to the lisinopril.  She has squeezing in both of her calfs.  States her feet are curling.  States both of her legs feel like she standing on a water pad.  Also endorsing seeing specks of white in her urine today.  She had a nuclear stress test that was abnormal on Monday.  Had a cardiac cath on Wednesday.  Neurovascularly intact on my assessment.  NIH is 0.  However given the recent instrumentation with the abnormal pain in extremities as well as dizziness CTA obtained.  Disposition pending workup.  Discussed with patient at length that I have a low suspicion for contrast-induced neuropathy given how well her kidney function is and that her cath was on Wednesday.  She has time to clear previous contrast prior to and during this load.  Patient understands benefits outweigh risk. [HD]   1705 CHEST ABDOMEN AND PELVIS:      No evidence of thoracoabdominal aortic aneurysm or dissection.      9 mm indeterminate left basilar pulmonary nodule unchanged from  10/18/2024. Continued follow-up to assess stability over at least a 2  year surveillance interval recommended.      Subcentimeter nodule in the right lobe of the thyroid.      Mild stranding in the right groin presumably related to recent  cardiac catheterization 07/30/2025.      Distal colon diverticulosis.      2.4 cm right adnexal cyst, possible postmenopausal ovarian cyst.  Consider pelvic ultrasound follow-up to assess stability.      Small umbilical hernia.      Other findings as described above.   [HD]   1705 No evidence of acute cortical infarct or intracranial hemorrhage. If  symptoms persist or if  there is clinical concern for acute cortical  infarction not identified in this exam, MRI with diffusion-weighted  images is a better means for further evaluation as clinically  warranted.   [HD]      ED Course User Index  [HD] Giovanna Lion DO         Diagnoses as of 08/03/25 1822   Dizziness       Disposition:  Discharged in stable condition with return precautions    Giovanna Lion DO      Procedures ? [employer] last updated 8/3/2025 6:22 PM        Giovanna Lion DO  08/03/25 1822

## 2025-08-04 LAB — HOLD SPECIMEN: NORMAL

## 2025-08-05 ENCOUNTER — OFFICE VISIT (OUTPATIENT)
Dept: PRIMARY CARE | Facility: CLINIC | Age: 65
End: 2025-08-05
Payer: COMMERCIAL

## 2025-08-05 VITALS
DIASTOLIC BLOOD PRESSURE: 100 MMHG | BODY MASS INDEX: 28.45 KG/M2 | HEART RATE: 80 BPM | WEIGHT: 177 LBS | HEIGHT: 66 IN | SYSTOLIC BLOOD PRESSURE: 140 MMHG | OXYGEN SATURATION: 96 %

## 2025-08-05 DIAGNOSIS — I10 ESSENTIAL HYPERTENSION, BENIGN: ICD-10-CM

## 2025-08-05 DIAGNOSIS — E87.1 HYPONATREMIA: Primary | ICD-10-CM

## 2025-08-05 LAB
ATRIAL RATE: 59 BPM
P AXIS: 67 DEGREES
P OFFSET: 186 MS
P ONSET: 127 MS
PR INTERVAL: 190 MS
Q ONSET: 222 MS
QRS COUNT: 9 BEATS
QRS DURATION: 76 MS
QT INTERVAL: 402 MS
QTC CALCULATION(BAZETT): 397 MS
QTC FREDERICIA: 399 MS
R AXIS: 19 DEGREES
T AXIS: 59 DEGREES
T OFFSET: 423 MS
VENTRICULAR RATE: 59 BPM

## 2025-08-05 PROCEDURE — 3080F DIAST BP >= 90 MM HG: CPT | Performed by: PHYSICIAN ASSISTANT

## 2025-08-05 PROCEDURE — 3077F SYST BP >= 140 MM HG: CPT | Performed by: PHYSICIAN ASSISTANT

## 2025-08-05 PROCEDURE — 99214 OFFICE O/P EST MOD 30 MIN: CPT | Performed by: PHYSICIAN ASSISTANT

## 2025-08-05 PROCEDURE — 3008F BODY MASS INDEX DOCD: CPT | Performed by: PHYSICIAN ASSISTANT

## 2025-08-05 RX ORDER — LISINOPRIL 30 MG/1
15 TABLET ORAL DAILY
Start: 2025-08-05 | End: 2026-09-09

## 2025-08-05 ASSESSMENT — PAIN SCALES - GENERAL: PAINLEVEL_OUTOF10: 0-NO PAIN

## 2025-08-05 NOTE — PROGRESS NOTES
"Subjective   Patient ID: Magda Jaimes is a 64 y.o. female who presents for Follow-up ([facility]).    Presents for follow up -   Has had ED visit due to SOB, dizziness. She has no dizziness today but feels like mouth burning outside her mouth.  Has started lisinopril 15mg (1/2 of 30mg) the last 2 days.   Continues with rosuvastatin, asa 81mg.   Has seen cardiology and had catheterization:   Cardiac Cath Post Procedure Notes:  Post Procedure Diagnosis: Mild non-obstructive CAD.  Recent BP readings slightly improved. Home readings - 120s-140s/80s           Review of Systems   All other systems reviewed and are negative.      Objective   BP (!) 140/100   Pulse 80   Ht 1.676 m (5' 6\")   Wt 80.3 kg (177 lb)   SpO2 96%   BMI 28.57 kg/m²     Physical Exam  Constitutional:       Appearance: Normal appearance.   HENT:      Mouth/Throat:      Mouth: Mucous membranes are moist.     Cardiovascular:      Rate and Rhythm: Normal rate and regular rhythm.      Heart sounds: Normal heart sounds.   Pulmonary:      Breath sounds: Normal breath sounds.     Musculoskeletal:      Cervical back: Normal range of motion.     Neurological:      Mental Status: She is alert.         Assessment/Plan   Diagnoses and all orders for this visit:  Essential hypertension, benign  -     lisinopril 30 mg tablet; Take 0.5 tablets (15 mg) by mouth once daily.         "

## 2025-08-06 ENCOUNTER — PATIENT OUTREACH (OUTPATIENT)
Dept: PRIMARY CARE | Facility: CLINIC | Age: 65
End: 2025-08-06
Payer: COMMERCIAL

## 2025-08-06 NOTE — PROGRESS NOTES
Confirmation of at least 2 patient identifiers.    Completed telephonic follow-up with patient after recent visit with PCP   Spoke to patient during outreach call.    Patient reports feeling: Improved    Patient has questions or concerns about medications: No    Have all prescribed medications been filled? Yes    Patient has necessary resources to manage their care? Yes    Patient has questions or concerns? No    Next care management follow-up approximately within one month.  Care  information provided to patient.    Encouraged to call providers for any questions concerns or change in condition

## 2025-08-07 ENCOUNTER — PATIENT OUTREACH (OUTPATIENT)
Dept: PRIMARY CARE | Facility: CLINIC | Age: 65
End: 2025-08-07
Payer: COMMERCIAL

## 2025-08-07 NOTE — PROGRESS NOTES
Patient states having some SSX of UTI states she called L/M with PCP office , wanted to know if there is anything else she should do go to Urgent Care ? I encouraged patient to await call from PCP office and or call back this afternoon. Patient verbalizes understanding.

## 2025-08-09 LAB
ATRIAL RATE: 74 BPM
P AXIS: 62 DEGREES
P OFFSET: 184 MS
P ONSET: 125 MS
PR INTERVAL: 196 MS
Q ONSET: 223 MS
QRS COUNT: 12 BEATS
QRS DURATION: 86 MS
QT INTERVAL: 414 MS
QTC CALCULATION(BAZETT): 459 MS
QTC FREDERICIA: 444 MS
R AXIS: -3 DEGREES
T AXIS: 35 DEGREES
T OFFSET: 430 MS
VENTRICULAR RATE: 74 BPM

## 2025-08-11 ENCOUNTER — HOSPITAL ENCOUNTER (EMERGENCY)
Facility: HOSPITAL | Age: 65
Discharge: HOME | End: 2025-08-11
Payer: COMMERCIAL

## 2025-08-11 ENCOUNTER — APPOINTMENT (OUTPATIENT)
Dept: PRIMARY CARE | Facility: CLINIC | Age: 65
End: 2025-08-11
Payer: COMMERCIAL

## 2025-08-11 ENCOUNTER — OFFICE VISIT (OUTPATIENT)
Dept: URGENT CARE | Age: 65
End: 2025-08-11
Payer: COMMERCIAL

## 2025-08-11 ENCOUNTER — APPOINTMENT (OUTPATIENT)
Dept: RADIOLOGY | Facility: HOSPITAL | Age: 65
End: 2025-08-11
Payer: COMMERCIAL

## 2025-08-11 VITALS
BODY MASS INDEX: 31.36 KG/M2 | RESPIRATION RATE: 22 BRPM | WEIGHT: 177 LBS | DIASTOLIC BLOOD PRESSURE: 82 MMHG | SYSTOLIC BLOOD PRESSURE: 145 MMHG | HEART RATE: 71 BPM | TEMPERATURE: 97 F | HEIGHT: 63 IN | OXYGEN SATURATION: 97 %

## 2025-08-11 VITALS
HEIGHT: 66 IN | RESPIRATION RATE: 16 BRPM | BODY MASS INDEX: 28.45 KG/M2 | SYSTOLIC BLOOD PRESSURE: 178 MMHG | OXYGEN SATURATION: 97 % | DIASTOLIC BLOOD PRESSURE: 91 MMHG | WEIGHT: 177 LBS | HEART RATE: 70 BPM | TEMPERATURE: 98.8 F

## 2025-08-11 DIAGNOSIS — K57.90 DIVERTICULOSIS: ICD-10-CM

## 2025-08-11 DIAGNOSIS — N83.201 CYST OF RIGHT OVARY: ICD-10-CM

## 2025-08-11 DIAGNOSIS — A08.4 VIRAL GASTROENTERITIS: Primary | ICD-10-CM

## 2025-08-11 DIAGNOSIS — R10.32 LEFT LOWER QUADRANT ABDOMINAL PAIN: ICD-10-CM

## 2025-08-11 DIAGNOSIS — R11.0 NAUSEA: ICD-10-CM

## 2025-08-11 DIAGNOSIS — N94.89 VAGINAL BURNING: ICD-10-CM

## 2025-08-11 DIAGNOSIS — N89.8 VAGINAL ODOR: ICD-10-CM

## 2025-08-11 DIAGNOSIS — N89.8 VAGINAL DISCHARGE: Primary | ICD-10-CM

## 2025-08-11 LAB
ALBUMIN SERPL BCP-MCNC: 4.7 G/DL (ref 3.4–5)
ALP SERPL-CCNC: 55 U/L (ref 33–136)
ALT SERPL W P-5'-P-CCNC: 23 U/L (ref 7–45)
ANION GAP SERPL CALC-SCNC: 11 MMOL/L (ref 10–20)
APPEARANCE UR: CLEAR
AST SERPL W P-5'-P-CCNC: 23 U/L (ref 9–39)
BASOPHILS # BLD AUTO: 0.02 X10*3/UL (ref 0–0.1)
BASOPHILS NFR BLD AUTO: 0.3 %
BILIRUB SERPL-MCNC: 1 MG/DL (ref 0–1.2)
BILIRUB UR STRIP.AUTO-MCNC: NEGATIVE MG/DL
BUN SERPL-MCNC: 7 MG/DL (ref 6–23)
CALCIUM SERPL-MCNC: 9.5 MG/DL (ref 8.6–10.3)
CHLORIDE SERPL-SCNC: 98 MMOL/L (ref 98–107)
CO2 SERPL-SCNC: 29 MMOL/L (ref 21–32)
COLOR UR: COLORLESS
CREAT SERPL-MCNC: 0.62 MG/DL (ref 0.5–1.05)
EGFRCR SERPLBLD CKD-EPI 2021: >90 ML/MIN/1.73M*2
EOSINOPHIL # BLD AUTO: 0.04 X10*3/UL (ref 0–0.7)
EOSINOPHIL NFR BLD AUTO: 0.6 %
ERYTHROCYTE [DISTWIDTH] IN BLOOD BY AUTOMATED COUNT: 12.2 % (ref 11.5–14.5)
GLUCOSE SERPL-MCNC: 101 MG/DL (ref 74–99)
GLUCOSE UR STRIP.AUTO-MCNC: NORMAL MG/DL
HCT VFR BLD AUTO: 42.5 % (ref 36–46)
HGB BLD-MCNC: 14.6 G/DL (ref 12–16)
IMM GRANULOCYTES # BLD AUTO: 0.01 X10*3/UL (ref 0–0.7)
IMM GRANULOCYTES NFR BLD AUTO: 0.2 % (ref 0–0.9)
KETONES UR STRIP.AUTO-MCNC: NEGATIVE MG/DL
LEUKOCYTE ESTERASE UR QL STRIP.AUTO: NEGATIVE
LIPASE SERPL-CCNC: 9 U/L (ref 9–82)
LYMPHOCYTES # BLD AUTO: 2.01 X10*3/UL (ref 1.2–4.8)
LYMPHOCYTES NFR BLD AUTO: 30.5 %
MAGNESIUM SERPL-MCNC: 2.09 MG/DL (ref 1.6–2.4)
MCH RBC QN AUTO: 31.2 PG (ref 26–34)
MCHC RBC AUTO-ENTMCNC: 34.4 G/DL (ref 32–36)
MCV RBC AUTO: 91 FL (ref 80–100)
MONOCYTES # BLD AUTO: 0.48 X10*3/UL (ref 0.1–1)
MONOCYTES NFR BLD AUTO: 7.3 %
NEUTROPHILS # BLD AUTO: 4.02 X10*3/UL (ref 1.2–7.7)
NEUTROPHILS NFR BLD AUTO: 61.1 %
NITRITE UR QL STRIP.AUTO: NEGATIVE
NRBC BLD-RTO: 0 /100 WBCS (ref 0–0)
PH UR STRIP.AUTO: 5 [PH]
PLATELET # BLD AUTO: 273 X10*3/UL (ref 150–450)
POC APPEARANCE, URINE: CLEAR
POC BACTERIAL VAGINITIS (RAPID): NEGATIVE
POC BILIRUBIN, URINE: NEGATIVE
POC BLOOD, URINE: ABNORMAL
POC COLOR, URINE: YELLOW
POC GLUCOSE, URINE: NEGATIVE MG/DL
POC KETONES, URINE: NEGATIVE MG/DL
POC LEUKOCYTES, URINE: NEGATIVE
POC NITRITE,URINE: NEGATIVE
POC PH, URINE: 6 PH
POC PROTEIN, URINE: NEGATIVE MG/DL
POC SPECIFIC GRAVITY, URINE: 1.01
POC UROBILINOGEN, URINE: 0.2 EU/DL
POTASSIUM SERPL-SCNC: 3.8 MMOL/L (ref 3.5–5.3)
PROT SERPL-MCNC: 7.6 G/DL (ref 6.4–8.2)
PROT UR STRIP.AUTO-MCNC: NEGATIVE MG/DL
RBC # BLD AUTO: 4.68 X10*6/UL (ref 4–5.2)
RBC # UR STRIP.AUTO: NEGATIVE MG/DL
SODIUM SERPL-SCNC: 134 MMOL/L (ref 136–145)
SP GR UR STRIP.AUTO: 1.01
UROBILINOGEN UR STRIP.AUTO-MCNC: NORMAL MG/DL
WBC # BLD AUTO: 6.6 X10*3/UL (ref 4.4–11.3)

## 2025-08-11 PROCEDURE — 85025 COMPLETE CBC W/AUTO DIFF WBC: CPT | Performed by: NURSE PRACTITIONER

## 2025-08-11 PROCEDURE — 74177 CT ABD & PELVIS W/CONTRAST: CPT | Performed by: STUDENT IN AN ORGANIZED HEALTH CARE EDUCATION/TRAINING PROGRAM

## 2025-08-11 PROCEDURE — 87905 IA NZMTC ACTV OTH/THN VIRUS: CPT

## 2025-08-11 PROCEDURE — 76856 US EXAM PELVIC COMPLETE: CPT | Performed by: RADIOLOGY

## 2025-08-11 PROCEDURE — 83735 ASSAY OF MAGNESIUM: CPT | Performed by: NURSE PRACTITIONER

## 2025-08-11 PROCEDURE — 76830 TRANSVAGINAL US NON-OB: CPT | Performed by: RADIOLOGY

## 2025-08-11 PROCEDURE — 99285 EMERGENCY DEPT VISIT HI MDM: CPT | Mod: 25

## 2025-08-11 PROCEDURE — 3008F BODY MASS INDEX DOCD: CPT

## 2025-08-11 PROCEDURE — 74177 CT ABD & PELVIS W/CONTRAST: CPT

## 2025-08-11 PROCEDURE — 81003 URINALYSIS AUTO W/O SCOPE: CPT | Performed by: NURSE PRACTITIONER

## 2025-08-11 PROCEDURE — 99204 OFFICE O/P NEW MOD 45 MIN: CPT

## 2025-08-11 PROCEDURE — 76856 US EXAM PELVIC COMPLETE: CPT

## 2025-08-11 PROCEDURE — 3077F SYST BP >= 140 MM HG: CPT

## 2025-08-11 PROCEDURE — 80053 COMPREHEN METABOLIC PANEL: CPT | Performed by: NURSE PRACTITIONER

## 2025-08-11 PROCEDURE — 3079F DIAST BP 80-89 MM HG: CPT

## 2025-08-11 PROCEDURE — 36415 COLL VENOUS BLD VENIPUNCTURE: CPT | Performed by: NURSE PRACTITIONER

## 2025-08-11 PROCEDURE — 83690 ASSAY OF LIPASE: CPT | Performed by: NURSE PRACTITIONER

## 2025-08-11 PROCEDURE — 2550000001 HC RX 255 CONTRASTS: Performed by: NURSE PRACTITIONER

## 2025-08-11 PROCEDURE — 81003 URINALYSIS AUTO W/O SCOPE: CPT

## 2025-08-11 RX ORDER — FLUCONAZOLE 150 MG/1
150 TABLET ORAL SEE ADMIN INSTRUCTIONS
Qty: 2 TABLET | Refills: 0 | Status: SHIPPED | OUTPATIENT
Start: 2025-08-11 | End: 2025-08-14

## 2025-08-11 RX ADMIN — IOHEXOL 75 ML: 350 INJECTION, SOLUTION INTRAVENOUS at 14:52

## 2025-08-11 ASSESSMENT — PAIN SCALES - GENERAL
PAINLEVEL_OUTOF10: 4
PAINLEVEL_OUTOF10: 0 - NO PAIN

## 2025-08-11 ASSESSMENT — PAIN DESCRIPTION - PAIN TYPE: TYPE: ACUTE PAIN

## 2025-08-11 ASSESSMENT — PATIENT HEALTH QUESTIONNAIRE - PHQ9
2. FEELING DOWN, DEPRESSED OR HOPELESS: NOT AT ALL
SUM OF ALL RESPONSES TO PHQ9 QUESTIONS 1 & 2: 0
1. LITTLE INTEREST OR PLEASURE IN DOING THINGS: NOT AT ALL

## 2025-08-11 ASSESSMENT — PAIN DESCRIPTION - LOCATION: LOCATION: ABDOMEN

## 2025-08-11 ASSESSMENT — PAIN - FUNCTIONAL ASSESSMENT: PAIN_FUNCTIONAL_ASSESSMENT: 0-10

## 2025-08-11 ASSESSMENT — PAIN DESCRIPTION - DESCRIPTORS: DESCRIPTORS: BURNING

## 2025-08-12 ENCOUNTER — TELEPHONE (OUTPATIENT)
Dept: URGENT CARE | Age: 65
End: 2025-08-12

## 2025-08-12 DIAGNOSIS — I10 ESSENTIAL HYPERTENSION, BENIGN: ICD-10-CM

## 2025-08-12 DIAGNOSIS — E78.5 HYPERLIPIDEMIA, UNSPECIFIED HYPERLIPIDEMIA TYPE: ICD-10-CM

## 2025-08-12 LAB — HOLD SPECIMEN: NORMAL

## 2025-08-13 LAB
BACTERIA UR CULT: NORMAL
YEAST SPEC QL CULT: NORMAL

## 2025-08-14 RX ORDER — ROSUVASTATIN CALCIUM 10 MG/1
10 TABLET, COATED ORAL DAILY
Qty: 90 TABLET | Refills: 3 | Status: SHIPPED | OUTPATIENT
Start: 2025-08-14

## 2025-08-14 RX ORDER — LISINOPRIL 30 MG/1
15 TABLET ORAL DAILY
Qty: 45 TABLET | Refills: 1 | Status: SHIPPED | OUTPATIENT
Start: 2025-08-14 | End: 2026-09-18

## 2025-08-15 ENCOUNTER — TELEPHONE (OUTPATIENT)
Dept: URGENT CARE | Age: 65
End: 2025-08-15

## 2025-08-19 ENCOUNTER — APPOINTMENT (OUTPATIENT)
Dept: RADIOLOGY | Facility: HOSPITAL | Age: 65
End: 2025-08-19
Payer: COMMERCIAL

## 2025-08-19 LAB — YEAST SPEC QL CULT: NORMAL

## 2025-08-20 ENCOUNTER — OFFICE VISIT (OUTPATIENT)
Dept: PRIMARY CARE | Facility: CLINIC | Age: 65
End: 2025-08-20
Payer: MEDICARE

## 2025-08-20 VITALS
DIASTOLIC BLOOD PRESSURE: 82 MMHG | SYSTOLIC BLOOD PRESSURE: 142 MMHG | WEIGHT: 174 LBS | BODY MASS INDEX: 29.71 KG/M2 | OXYGEN SATURATION: 98 % | HEIGHT: 64 IN | HEART RATE: 63 BPM

## 2025-08-20 DIAGNOSIS — R73.03 PRE-DIABETES: ICD-10-CM

## 2025-08-20 DIAGNOSIS — I10 ESSENTIAL HYPERTENSION, BENIGN: ICD-10-CM

## 2025-08-20 DIAGNOSIS — E87.1 HYPONATREMIA: ICD-10-CM

## 2025-08-20 DIAGNOSIS — E78.2 MIXED HYPERLIPIDEMIA: ICD-10-CM

## 2025-08-20 DIAGNOSIS — Z00.00 ROUTINE GENERAL MEDICAL EXAMINATION AT HEALTH CARE FACILITY: Primary | ICD-10-CM

## 2025-08-20 PROCEDURE — 3079F DIAST BP 80-89 MM HG: CPT | Performed by: PHYSICIAN ASSISTANT

## 2025-08-20 PROCEDURE — 1160F RVW MEDS BY RX/DR IN RCRD: CPT | Performed by: PHYSICIAN ASSISTANT

## 2025-08-20 PROCEDURE — G0439 PPPS, SUBSEQ VISIT: HCPCS | Performed by: PHYSICIAN ASSISTANT

## 2025-08-20 PROCEDURE — 1159F MED LIST DOCD IN RCRD: CPT | Performed by: PHYSICIAN ASSISTANT

## 2025-08-20 PROCEDURE — 3077F SYST BP >= 140 MM HG: CPT | Performed by: PHYSICIAN ASSISTANT

## 2025-08-20 PROCEDURE — 99214 OFFICE O/P EST MOD 30 MIN: CPT | Performed by: PHYSICIAN ASSISTANT

## 2025-08-20 PROCEDURE — 1170F FXNL STATUS ASSESSED: CPT | Performed by: PHYSICIAN ASSISTANT

## 2025-08-20 PROCEDURE — 1126F AMNT PAIN NOTED NONE PRSNT: CPT | Performed by: PHYSICIAN ASSISTANT

## 2025-08-20 PROCEDURE — 3008F BODY MASS INDEX DOCD: CPT | Performed by: PHYSICIAN ASSISTANT

## 2025-08-20 PROCEDURE — 1123F ACP DISCUSS/DSCN MKR DOCD: CPT | Performed by: PHYSICIAN ASSISTANT

## 2025-08-20 PROCEDURE — 99215 OFFICE O/P EST HI 40 MIN: CPT | Mod: 25 | Performed by: PHYSICIAN ASSISTANT

## 2025-08-20 ASSESSMENT — ACTIVITIES OF DAILY LIVING (ADL)
USING TELEPHONE: INDEPENDENT
DRESSING: INDEPENDENT
JUDGMENT_ADEQUATE_SAFELY_COMPLETE_DAILY_ACTIVITIES: YES
GROCERY_SHOPPING: INDEPENDENT
NEEDS ASSISTANCE WITH FOOD: INDEPENDENT
PREPARING MEALS: INDEPENDENT
USING TRANSPORTATION: INDEPENDENT
DOING_HOUSEWORK: INDEPENDENT
STIL DRIVING: YES
TOILETING: INDEPENDENT
FEEDING: INDEPENDENT
ADEQUATE_TO_COMPLETE_ADL: YES
BATHING: INDEPENDENT
EATING: INDEPENDENT
TAKING_MEDICATION: INDEPENDENT
MANAGING_FINANCES: INDEPENDENT

## 2025-08-20 ASSESSMENT — PAIN SCALES - GENERAL: PAINLEVEL_OUTOF10: 0-NO PAIN

## 2025-08-20 ASSESSMENT — COGNITIVE AND FUNCTIONAL STATUS - GENERAL: VERBAL FLUENCY - ANIMAL NAMES (0 TO 25): 3

## 2025-08-21 ENCOUNTER — PATIENT MESSAGE (OUTPATIENT)
Dept: PRIMARY CARE | Facility: CLINIC | Age: 65
End: 2025-08-21
Payer: MEDICARE

## 2025-08-21 DIAGNOSIS — E87.1 HYPONATREMIA: ICD-10-CM

## 2025-08-21 DIAGNOSIS — I10 ESSENTIAL HYPERTENSION, BENIGN: ICD-10-CM

## 2025-08-21 DIAGNOSIS — Z00.00 ROUTINE GENERAL MEDICAL EXAMINATION AT HEALTH CARE FACILITY: Primary | ICD-10-CM

## 2025-08-25 DIAGNOSIS — E87.1 HYPONATREMIA: ICD-10-CM

## 2025-08-25 DIAGNOSIS — I10 ESSENTIAL HYPERTENSION, BENIGN: ICD-10-CM

## 2025-08-26 ENCOUNTER — PATIENT MESSAGE (OUTPATIENT)
Dept: PRIMARY CARE | Facility: CLINIC | Age: 65
End: 2025-08-26
Payer: MEDICARE

## 2025-08-28 ENCOUNTER — PATIENT MESSAGE (OUTPATIENT)
Dept: PRIMARY CARE | Facility: CLINIC | Age: 65
End: 2025-08-28
Payer: MEDICARE

## 2025-09-09 ENCOUNTER — APPOINTMENT (OUTPATIENT)
Dept: PRIMARY CARE | Facility: CLINIC | Age: 65
End: 2025-09-09
Payer: COMMERCIAL

## 2026-02-02 ENCOUNTER — APPOINTMENT (OUTPATIENT)
Dept: NEPHROLOGY | Facility: CLINIC | Age: 66
End: 2026-02-02
Payer: MEDICARE

## (undated) DEVICE — CLOSURE DEVICE, VASCULAR, MYNX, 5FR

## (undated) DEVICE — ANGIO KIT, LEFT HEART, LF, CUSTOM

## (undated) DEVICE — ACCESS SYSTEM, PINNACLE PRECISION, 5FR X 10CM, ECHOGENIC NEEDLE

## (undated) DEVICE — CATHETER, INFINITI DIAGNOSTIC, 5 FR 100CM 3DRC, WILLIAMS RIGHT OR NO TORQUE

## (undated) DEVICE — CATHETER, ANGIO, IMPULSE, FL4, 5 FR X 100 CM